# Patient Record
Sex: MALE | Race: OTHER | NOT HISPANIC OR LATINO | Employment: OTHER | URBAN - METROPOLITAN AREA
[De-identification: names, ages, dates, MRNs, and addresses within clinical notes are randomized per-mention and may not be internally consistent; named-entity substitution may affect disease eponyms.]

---

## 2018-06-19 ENCOUNTER — EVALUATION (OUTPATIENT)
Dept: PHYSICAL THERAPY | Facility: CLINIC | Age: 78
End: 2018-06-19
Payer: MEDICARE

## 2018-06-19 DIAGNOSIS — G20 PARKINSON'S DISEASE (HCC): Primary | ICD-10-CM

## 2018-06-19 PROCEDURE — G8978 MOBILITY CURRENT STATUS: HCPCS | Performed by: PHYSICAL THERAPIST

## 2018-06-19 PROCEDURE — G8979 MOBILITY GOAL STATUS: HCPCS | Performed by: PHYSICAL THERAPIST

## 2018-06-19 PROCEDURE — 97163 PT EVAL HIGH COMPLEX 45 MIN: CPT | Performed by: PHYSICAL THERAPIST

## 2018-06-19 RX ORDER — ACETAMINOPHEN 500 MG
500 TABLET ORAL EVERY 6 HOURS PRN
COMMUNITY

## 2018-06-19 RX ORDER — CARBIDOPA/LEVODOPA 25MG-250MG
1 TABLET ORAL 3 TIMES DAILY
COMMUNITY
End: 2022-02-07 | Stop reason: SDUPTHER

## 2018-06-19 NOTE — LETTER
2018    Radha Blackburn MD  [address]  [address] [facility]  [address]  [id number]    Patient: Kenton Duffy   YOB: 1940   Date of Visit: 2018     Encounter Diagnosis     ICD-10-CM    1  Parkinson's disease [facility]) [place]        Dear Dr Terri De Paz:    Please review the attached Plan of Care from Adalmontrell Alexander's recent visit  Please verify that you agree therapy should continue by signing the attached document and sending it back to our office  If you have any questions or concerns, please don't hesitate to call  Sincerely,    Eddi Freedman, PT      Referring Provider:      I certify that I have read the below Plan of Care and certify the need for these services furnished under this plan of treatment while under my care  Radha Blackburn MD  [address]  [address] [facility] 109: [phone]          PT Evaluation     Today's date: 2018  Patient name: Kenton Duffy  : 1940  MRN: [id number]  Referring provider: Rayfield Curling, MD  Dx: No diagnosis found  Assessment    Assessment details: Pt presents to physical therapy with a diagnosis of Parkinson's Disease  At this time patient has decreased LE strength, decreased neuromuscular coordination, pain in LE upon walking, dependent with transfers, unable to walk, dependent with ADLs and IADLs, and unable to stand without UE assist for more than 2 minutes  Goals for therapy are to improve ability to stand and to stand for transfers instead of using raza lift   Longer term goal to walk through home using RW  Understanding of Dx/Px/POC: good   Prognosis: fair    Goals  STG:  Pt will compelte HEP with daughter daily within 4 weeks  Pt will be able to complete sit to stand transfer with min assist within 4 weeks  Pt will be able to stay seated at edge of bed for 5 minutes within 4 weeks  Pt will be completing stand pivot transfer with mod assist within 4 weeks    LTG:  Pt will be able to walk short distance with assist through home  Pt will be able to complete all transfers without raza lift    Plan  Planned therapy interventions: strengthening, stretching, therapeutic exercise, gait training and home exercise program  Frequency: 2x week  Duration in weeks: 12  Treatment plan discussed with: patient        Subjective Evaluation    History of Present Illness  Mechanism of injury: Beginning in October pt got really sick and he was unable to walk  He was diagnosed with Parkinson's Diseaes  Prior to that he was walking independently with RW  Since the diagnosis of PD he has been unable to get up and out of bed  He occasionally does exercse in bed  He is able to use UE to maneuver but he is unable to stand  He lives in a one story home  He is unable to get up out of chair without a raza lift  He bilaterally has hip problems and that was why he needed to use a walker previously  He also has dementia  They can get him up to walk with 2-3 people helping for a few steps  He had speech therapy at home and is now on all pureed foods  He has not had physical therapy  He was not expected to live past a month upon his hospital admission in December in which he was in the hospital for a couple months  He has 24 hr care from family at home  He has not seen a neurologist since being the hospital in October  Daughter has changed dosage of sinemet due to appetite decrease when taking it  Pain  Location: lower extremities    Social Support  Lives in: [facility]  Lives with: adult children    Exercise history: some bed exercises          Objective              Sensation Left Right   Unable to assess due to decreased cognition                        Muscle Tone Left Right   Modified Anitha Scale UE WNL UE WNL   Hamstring 2+ 2+   Gastroc 3 3   Quad 2+ 2+     Strength: Lower extremity grossly 3/5 within ROM as pt can stand and bear weight through LE   Unable to test due to inability to follow multistep direction  ROM: Plan to measure LE at next visit while patient is on mat table    Transfers    Sit To Stand Mod Assist   W/C To Bed Dependent   Sit To Supine Max Assist   Roll Max Assist         Gait Assessment:Pt was able to take 4 steps forward with assist for right LE advancement and mod assist for balance in //bars with sliding feet forward         Precautions: aspiration precautions    Daily Treatment Diary     Manual                                                                                   Exercise Diary                                                                                                                                                                                                                                                                                      Modalities

## 2018-06-19 NOTE — PROGRESS NOTES
PT Evaluation     Today's date: 2018  Patient name: Haris Bonilla  : 1940  MRN: [id number]  Referring provider: Niki Figuerao MD  Dx: No diagnosis found  Assessment    Assessment details: Pt presents to physical therapy with a diagnosis of Parkinson's Disease  At this time patient has decreased LE strength, decreased neuromuscular coordination, pain in LE upon walking, dependent with transfers, unable to walk, dependent with ADLs and IADLs, and unable to stand without UE assist for more than 2 minutes  Goals for therapy are to improve ability to stand and to stand for transfers instead of using raza lift  Longer term goal to walk through home using RW  Understanding of Dx/Px/POC: good   Prognosis: fair    Goals  STG:  Pt will compelte HEP with daughter daily within 4 weeks  Pt will be able to complete sit to stand transfer with min assist within 4 weeks  Pt will be able to stay seated at edge of bed for 5 minutes within 4 weeks  Pt will be completing stand pivot transfer with mod assist within 4 weeks    LTG:  Pt will be able to walk short distance with assist through home  Pt will be able to complete all transfers without raza lift    Plan  Planned therapy interventions: strengthening, stretching, therapeutic exercise, gait training and home exercise program  Frequency: 2x week  Duration in weeks: 12  Treatment plan discussed with: patient        Subjective Evaluation    History of Present Illness  Mechanism of injury: Beginning in October pt got really sick and he was unable to walk  He was diagnosed with Parkinson's Diseaes  Prior to that he was walking independently with RW  Since the diagnosis of PD he has been unable to get up and out of bed  He occasionally does exercse in bed  He is able to use UE to maneuver but he is unable to stand  He lives in a one story home  He is unable to get up out of chair without a raza lift   He bilaterally has hip problems and that was why he needed to use a walker previously  He also has dementia  They can get him up to walk with 2-3 people helping for a few steps  He had speech therapy at home and is now on all pureed foods  He has not had physical therapy  He was not expected to live past a month upon his hospital admission in December in which he was in the hospital for a couple months  He has 24 hr care from family at home  He has not seen a neurologist since being the hospital in October  Daughter has changed dosage of sinemet due to appetite decrease when taking it  Pain  Location: lower extremities    Social Support  Lives in: [facility]  Lives with: adult children    Exercise history: some bed exercises          Objective              Sensation Left Right   Unable to assess due to decreased cognition                        Muscle Tone Left Right   Modified Anitha Scale UE WNL UE WNL   Hamstring 2+ 2+   Gastroc 3 3   Quad 2+ 2+     Strength: Lower extremity grossly 3/5 within ROM as pt can stand and bear weight through LE  Unable to test due to inability to follow multistep direction  ROM: Plan to measure LE at next visit while patient is on mat table    Transfers    Sit To Stand Mod Assist   W/C To Bed Dependent   Sit To Supine Max Assist   Roll Max Assist         Gait Assessment:Pt was able to take 4 steps forward with assist for right LE advancement and mod assist for balance in //bars with sliding feet forward         Precautions: aspiration precautions    Daily Treatment Diary     Manual                                                                                   Exercise Diary                                                                                                                                                                                                                                                                                      Modalities

## 2018-06-27 ENCOUNTER — OFFICE VISIT (OUTPATIENT)
Dept: NEUROLOGY | Facility: CLINIC | Age: 78
End: 2018-06-27
Payer: MEDICARE

## 2018-06-27 ENCOUNTER — OFFICE VISIT (OUTPATIENT)
Dept: PHYSICAL THERAPY | Facility: CLINIC | Age: 78
End: 2018-06-27
Payer: MEDICARE

## 2018-06-27 VITALS — SYSTOLIC BLOOD PRESSURE: 102 MMHG | DIASTOLIC BLOOD PRESSURE: 57 MMHG | HEART RATE: 83 BPM

## 2018-06-27 DIAGNOSIS — G20 PARKINSON'S DISEASE (HCC): Primary | ICD-10-CM

## 2018-06-27 DIAGNOSIS — R29.898 LOWER EXTREMITY WEAKNESS: ICD-10-CM

## 2018-06-27 DIAGNOSIS — F02.80 ALZHEIMER DISEASE (HCC): ICD-10-CM

## 2018-06-27 DIAGNOSIS — R13.10 DYSPHAGIA: ICD-10-CM

## 2018-06-27 DIAGNOSIS — S06.9X9A TBI (TRAUMATIC BRAIN INJURY) (HCC): ICD-10-CM

## 2018-06-27 DIAGNOSIS — G30.9 ALZHEIMER DISEASE (HCC): ICD-10-CM

## 2018-06-27 PROBLEM — S06.9XAA TBI (TRAUMATIC BRAIN INJURY): Status: ACTIVE | Noted: 2018-06-27

## 2018-06-27 PROCEDURE — 97112 NEUROMUSCULAR REEDUCATION: CPT | Performed by: PHYSICAL THERAPIST

## 2018-06-27 PROCEDURE — 99205 OFFICE O/P NEW HI 60 MIN: CPT | Performed by: PHYSICAL MEDICINE & REHABILITATION

## 2018-06-27 RX ORDER — ASPIRIN 81 MG/1
81 TABLET ORAL DAILY
COMMUNITY

## 2018-06-27 RX ORDER — LEVOTHYROXINE SODIUM 0.05 MG/1
TABLET ORAL
COMMUNITY
Start: 2018-06-23 | End: 2019-09-04 | Stop reason: SDUPTHER

## 2018-06-27 RX ORDER — TAMSULOSIN HYDROCHLORIDE 0.4 MG/1
CAPSULE ORAL
COMMUNITY
Start: 2018-06-23 | End: 2020-12-07 | Stop reason: SDUPTHER

## 2018-06-27 NOTE — PROGRESS NOTES
Daily Note     Today's date: 2018  Patient name: Yesi Carson  : 1940  MRN: [id number]  Referring provider: Lucas Lacy MD  Dx:   Encounter Diagnosis     ICD-10-CM    1  Parkinson's disease ([facility] [address] ) G20                   Subjective: Pt reports no new changes since last session      Objective: See treatment diary below      Assessment: Pt had significant lower extremity pain during any movement of LE  Attemted to stretch and teach PROm to daughter this sesison but patient had significan tpain  He was able to hold balance seated on EOM  Therapist encouraged to complete at home  Transfer sit to stand and stand pivot with min to mod assist  Completed standing this session which pt was able to hold but no steps were taken this session  Plan: Continue per plan of care        Precautions: PD, fall risk    Specialty Daily Treatment Diary     Manual         PROM LE 10 min                                           Exercise Diary         Seated EOM 2 min x 2       bridges 2x10       standing 2 min x 3       Sit to stand 5x                                                                                                                                           Modalities
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
## 2018-07-02 ENCOUNTER — TELEPHONE (OUTPATIENT)
Dept: NEUROLOGY | Facility: CLINIC | Age: 78
End: 2018-07-02

## 2018-07-10 ENCOUNTER — APPOINTMENT (OUTPATIENT)
Dept: PHYSICAL THERAPY | Facility: CLINIC | Age: 78
End: 2018-07-10
Payer: MEDICARE

## 2018-07-10 ENCOUNTER — APPOINTMENT (OUTPATIENT)
Dept: SPEECH THERAPY | Facility: CLINIC | Age: 78
End: 2018-07-10
Payer: MEDICARE

## 2018-07-12 ENCOUNTER — OFFICE VISIT (OUTPATIENT)
Dept: PHYSICAL THERAPY | Facility: CLINIC | Age: 78
End: 2018-07-12
Payer: MEDICARE

## 2018-07-12 ENCOUNTER — EVALUATION (OUTPATIENT)
Dept: SPEECH THERAPY | Facility: CLINIC | Age: 78
End: 2018-07-12
Payer: MEDICARE

## 2018-07-12 DIAGNOSIS — G20 PARKINSON'S DISEASE (HCC): Primary | ICD-10-CM

## 2018-07-12 DIAGNOSIS — R13.12 OROPHARYNGEAL DYSPHAGIA: Primary | ICD-10-CM

## 2018-07-12 DIAGNOSIS — G20 PARKINSON'S DISEASE (HCC): ICD-10-CM

## 2018-07-12 PROCEDURE — G8996 SWALLOW CURRENT STATUS: HCPCS

## 2018-07-12 PROCEDURE — 92610 EVALUATE SWALLOWING FUNCTION: CPT

## 2018-07-12 PROCEDURE — G8997 SWALLOW GOAL STATUS: HCPCS

## 2018-07-12 PROCEDURE — 97140 MANUAL THERAPY 1/> REGIONS: CPT | Performed by: PHYSICAL THERAPIST

## 2018-07-12 PROCEDURE — 97530 THERAPEUTIC ACTIVITIES: CPT | Performed by: PHYSICAL THERAPIST

## 2018-07-12 NOTE — PROGRESS NOTES
Daily Note     Today's date: 2018  Patient name: Yareli Hills  : 1940  MRN: [id number]  Referring provider: Justus Díaz MD  Dx:   Encounter Diagnosis     ICD-10-CM    1  Parkinson's disease ([facility] [address] ) Jordin Netholly        Start Time: 1500  Stop Time: 4660  Total time in clinic (min): 55 minutes    Subjective: Pt's daughter stated pt was with more soreness after last session in his knees  Did not attempt anything with him at home, did not feel comfortable  Objective: See treatment diary below      Assessment: Pt continues with significant BLE tone and increased pain in B knees specifically with WB'ing  However as session progressed and more WB'ing continued BLE, especially the L, extension improved  Pt required max assist x1 for all transfers this session  Daughter present and educated on transfers and getting pt EOB for dynamic sitting activities  Plan: Continue with [address] activities in sitting and standing      Precautions: PD, fall risk    Specialty Daily Treatment Diary     Manual         PROM LE 10 min x15 min                                          Exercise Diary        Seated EOM 2 min x 2 With cones reaching x4 cones R/L ea      bridges 2x10       standing 2 min x 3 x3 in // bars  x5 EOM      Sit to stand 5x                                                                                                                                           Modalities
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
## 2018-07-13 ENCOUNTER — TELEPHONE (OUTPATIENT)
Dept: NEUROLOGY | Facility: CLINIC | Age: 78
End: 2018-07-13

## 2018-07-13 ENCOUNTER — TELEPHONE (OUTPATIENT)
Dept: SPEECH THERAPY | Facility: CLINIC | Age: 78
End: 2018-07-13

## 2018-07-13 NOTE — TELEPHONE ENCOUNTER
Dr Chrystal Dakin, I emailed over the form you requested  Please let me know if you'd like to use that for the script for needed item, or just place a regular script  I can than fax it over to [place]  Thank you!

## 2018-07-13 NOTE — TELEPHONE ENCOUNTER
Meryle Leigh, MD Stephanie Jewelene Holiday can you help with this - see below        Thanks - Dr Consuelo Nelson      ----- Message -----   From: Slick Lema CCC-SLP   Sent: 7/13/2018  12:31 PM   To: Meryle Leigh, MD     Hi, Dr Peggy Earl:     I completed a clinical dysphagia evaluation with Mr Rhina Guadarrama yesterday  Both of his daughters were present during the evaluation  I provided them with extensive education throughout the session  I stressed the importance that he received a VFSS and provided written instructions on how to schedule the appointment  You provided me with the Rx--thank you  I followed-up with family again today to educate them again on the importance that they make the appointment as soon as possible  Within the evaluation write-up, I noted that I observed a white coating on his lingual surface (?thrush)  Family reported that they are not completing oral care  I provided them with education on aspiration precautions and the importance of proper oral care  I'm recommending at this time that they complete oral care with a powered suction system as Mr Rhina Guadarrama is with reduced cognitive ability to expectorate  [facility] provided me with information that most insurances cover this type of equipment  Can you please provide an appropriate referral to get the process of ordering the equipment initiated? Thank you so much,   JEFFREY Andrade S , 64 [facility] [place] Clinician  [employer]italStim® PLUS Certified Clinician   [address] [address], [facility] [place], [address]   Telephone: [phone]  Fax: [phone]  Email: [email or web address]

## 2018-07-13 NOTE — TELEPHONE ENCOUNTER
Spoke with patient's daughter re: the importance that family schedule VFSS as soon as possible  Reciprocal comprehension verbally expressed  Patient's daughter stated that her sister Priscila Bill: other emergency contact number) takes care of appointment scheduling--attempted to contact Leno Haroing at listed telephone number; however, unable to l/m due to mailbox being full

## 2018-07-17 ENCOUNTER — APPOINTMENT (OUTPATIENT)
Dept: PHYSICAL THERAPY | Facility: CLINIC | Age: 78
End: 2018-07-17
Payer: MEDICARE

## 2018-07-17 ENCOUNTER — APPOINTMENT (OUTPATIENT)
Dept: SPEECH THERAPY | Facility: CLINIC | Age: 78
End: 2018-07-17
Payer: MEDICARE

## 2018-07-17 DIAGNOSIS — S06.9X9S TRAUMATIC BRAIN INJURY WITH LOSS OF CONSCIOUSNESS, SEQUELA (HCC): ICD-10-CM

## 2018-07-17 DIAGNOSIS — G20 PARKINSON DISEASE (HCC): ICD-10-CM

## 2018-07-17 DIAGNOSIS — R13.12 OROPHARYNGEAL DYSPHAGIA: Primary | ICD-10-CM

## 2018-07-20 ENCOUNTER — APPOINTMENT (OUTPATIENT)
Dept: PHYSICAL THERAPY | Facility: CLINIC | Age: 78
End: 2018-07-20
Payer: MEDICARE

## 2018-07-20 ENCOUNTER — APPOINTMENT (OUTPATIENT)
Dept: SPEECH THERAPY | Facility: CLINIC | Age: 78
End: 2018-07-20
Payer: MEDICARE

## 2018-07-23 ENCOUNTER — APPOINTMENT (OUTPATIENT)
Dept: SPEECH THERAPY | Facility: CLINIC | Age: 78
End: 2018-07-23
Payer: MEDICARE

## 2018-07-23 ENCOUNTER — APPOINTMENT (OUTPATIENT)
Dept: PHYSICAL THERAPY | Facility: CLINIC | Age: 78
End: 2018-07-23
Payer: MEDICARE

## 2018-07-24 ENCOUNTER — TELEPHONE (OUTPATIENT)
Dept: SPEECH THERAPY | Facility: CLINIC | Age: 78
End: 2018-07-24

## 2018-07-24 NOTE — TELEPHONE ENCOUNTER
Attempted to contact patient's daughter via telephone again today  Patient has not yet been scheduled for a videofluoroscopic swallow study (VFSS) as recommended by this evaluating therapist  Patient's family was presented with the Rx from the referring provider, as well as step-by-step instructions on how to scheduled VFSS  A message was left on patient's daughter's voicemail to return call to this clinic

## 2018-07-26 ENCOUNTER — OFFICE VISIT (OUTPATIENT)
Dept: PHYSICAL THERAPY | Facility: CLINIC | Age: 78
End: 2018-07-26
Payer: MEDICARE

## 2018-07-26 ENCOUNTER — OFFICE VISIT (OUTPATIENT)
Dept: SPEECH THERAPY | Facility: CLINIC | Age: 78
End: 2018-07-26
Payer: MEDICARE

## 2018-07-26 DIAGNOSIS — R13.12 OROPHARYNGEAL DYSPHAGIA: Primary | ICD-10-CM

## 2018-07-26 DIAGNOSIS — G20 PARKINSON'S DISEASE (HCC): Primary | ICD-10-CM

## 2018-07-26 DIAGNOSIS — G20 PARKINSON'S DISEASE (HCC): ICD-10-CM

## 2018-07-26 PROCEDURE — 92526 ORAL FUNCTION THERAPY: CPT | Performed by: SPEECH-LANGUAGE PATHOLOGIST

## 2018-07-26 PROCEDURE — G8997 SWALLOW GOAL STATUS: HCPCS | Performed by: SPEECH-LANGUAGE PATHOLOGIST

## 2018-07-26 PROCEDURE — 97530 THERAPEUTIC ACTIVITIES: CPT | Performed by: PHYSICAL THERAPIST

## 2018-07-26 PROCEDURE — 97110 THERAPEUTIC EXERCISES: CPT | Performed by: PHYSICAL THERAPIST

## 2018-07-26 PROCEDURE — 97140 MANUAL THERAPY 1/> REGIONS: CPT | Performed by: PHYSICAL THERAPIST

## 2018-07-26 PROCEDURE — G8996 SWALLOW CURRENT STATUS: HCPCS | Performed by: SPEECH-LANGUAGE PATHOLOGIST

## 2018-07-26 NOTE — PROGRESS NOTES
Speech-Language Pathology Re-Evaluation    Today's date: 2018  Patients name: Sidney Monahan  : 1940  MRN: [id number]  Safety measures: PD, dementia, TBI, aspiration precautions (puree with NTL)  Referring provider: Bella Alexander MD    Subjective comments: Patient was accompanied to therapy today by his two daughters  Patient/family's goal(s): "To swallow safely and not to end up with aspiration pneumonia again "    Assessments    Patient had a videofluoroscopic swallow study (VFSS) completed on 2018 as recommended by this clinician  The following results were gathered from that assessment:    Moderate oropharyngeal dysphagia c/b premature spillage of nectar thick and honey thick liquids to valleculae, premature spillage to valeculae and pyriforms with deep penetration prior to swallow [with thin liquids]  No spontaneous cough with deep penetration [on thin liquids], weak unproductive cued cough       The SLP, who completed VFSS, recommended puree with NTL as the safest, least restrictive diet/liquid consistency for patient based upon findings  Whole meds in puree, upright posture for meals/meds, no thin water, no ice chips, supervision with PO intake, assistance with PO intake, one bite/sip at a time, and small bites/sips  Trial of NMES with patient: Patient consumed chocolate pudding with NTL via teaspoon  VitalStim (NMES)    Channel(s) used: 1, 2   Placement: 3b   Frequency: 50 pulses per second   Phase Duration: 200 microseconds   Treatment Duration: 33 minutes   Min mA level: 5 5 mA   Max mA level: 8 5 mA     Goals    Short-term goals:  1  (NEW GOAL) Patient will tolerate NMES (i e , VitalStim) in conjunction with HLE exercises without overt s/sx of penetration or aspiration (to be achieved in 8-12 weeks)      2  (NEW GOAL) Patient's family will implement compensatory strategies (e g , upright positioning, small bites/sips, slow rate, alternation of consistencies, etc ) when feeding patient with 100% accuracy to increase patient's swallowing safety (to be achieved in 1-2 weeks)  3  (NEW GOAL) Patient's family will demonstrate comprehension of the importance of proper oral care to implement with patient with 100% acc to reduce the risk of aspiration pneumonia (to be achieved in 1-2 weeks)  Long-term goals:  1  Patient will receive a videofluoroscopic swallow study (VFSS) to fully assess physiology and anatomy of the swallow and to determine the appropriate diet and/or rehabilitation exercises by discharge  -- MET    2  Patient will maintain adequate hydration and nutrition with optimum safety and efficacy of swallowing function on P O  intake without overt s/sx of penetration or aspiration by discharge  -- PARTIALLY MET    Functional Limitations Reporting (G-codes):   Flowsheet Rows      Most Recent Value   SLP G-Codes   FOTO information reviewed  N/A   Assessment Type  Re-evaluation   Functional Limitations  Swallowing   Swallow Current Status ()  CL   Swallow Goal Status ()  CK          Impressions/Recommendations    Impressions: Patient presents with moderate oropharyngeal dysphagia characterized by reduced cohesive bolus formation and control, a-p transfer, piecemeal deglutition, lingual pumping, reduced swallow initiation, reduced HLE upon palpation  Per VFSS, premature spillage to the valleculae and pyriform sinuses on HTL and NTL--deep penetration prior to swallow with no spontaneous cough on TL (weak unproductive cued cough)  No overt s/sx of penetration and/or aspiration were noted on this date of service; however, this does not r/o silent aspiration  Patient's daughters reported that they are completing increased oral care at home; however, lingual sores appeared from cleaning  MD reportedly prescribed mouth rinse, which they apply to patient's tongue to help with sores   Daughters reported that powered suction system was ordered and they have in their possession; however, toothbrushes with oral rinse which connect to suction device have not yet been purchased  Clinician continued to stress the importance that they initiate oral care program ASAP  Patient continues to have white coating on tongue, which is suspected to be thrush  Recommend that patient f/u with physician for proper dx and treatment  Patient should also consult with a nutritionist, as daughters reported he has lost weight throughout the year  Recommendations:  -Patient would benefit from outpatient skilled Speech Therapy services : Dysphagia therapy    -Frequency: 2x weekly  -Duration: 4-6 weeks    -Intervention certification from: 3/76/3912  -Intervention certification to: 89/14/9096    Visit Tracking:   -Referring provider: Epic  -Billing guidelines: CMS  -Visit #1/10  (total visits: 2)  -Medicare  [place]  -RE due 08/26/2018
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
## 2018-07-31 ENCOUNTER — OFFICE VISIT (OUTPATIENT)
Dept: SPEECH THERAPY | Facility: CLINIC | Age: 78
End: 2018-07-31
Payer: MEDICARE

## 2018-07-31 ENCOUNTER — OFFICE VISIT (OUTPATIENT)
Dept: PHYSICAL THERAPY | Facility: CLINIC | Age: 78
End: 2018-07-31
Payer: MEDICARE

## 2018-07-31 DIAGNOSIS — G20 PARKINSON'S DISEASE (HCC): Primary | ICD-10-CM

## 2018-07-31 DIAGNOSIS — G20 PARKINSON'S DISEASE (HCC): ICD-10-CM

## 2018-07-31 DIAGNOSIS — R13.12 OROPHARYNGEAL DYSPHAGIA: Primary | ICD-10-CM

## 2018-07-31 PROCEDURE — G8978 MOBILITY CURRENT STATUS: HCPCS | Performed by: PHYSICAL THERAPIST

## 2018-07-31 PROCEDURE — 92526 ORAL FUNCTION THERAPY: CPT | Performed by: SPEECH-LANGUAGE PATHOLOGIST

## 2018-07-31 PROCEDURE — 97112 NEUROMUSCULAR REEDUCATION: CPT | Performed by: PHYSICAL THERAPIST

## 2018-07-31 PROCEDURE — 97530 THERAPEUTIC ACTIVITIES: CPT | Performed by: PHYSICAL THERAPIST

## 2018-07-31 PROCEDURE — G8979 MOBILITY GOAL STATUS: HCPCS | Performed by: PHYSICAL THERAPIST

## 2018-07-31 NOTE — PROGRESS NOTES
Daily Speech Treatment Note    Today's date: 2018  Patients name: Sonia Chavez  : 1940  MRN: [id number]  Safety measures: PD, dementia, TBI, aspiration precautions (puree with NTL)  Referring provider: Bryant Felder MD    Primary Diagnosis/Billing code: U45 74  Secondary Diagnosis/ Billing code: Marilia Qureshi    Visit Tracking:  -Referring provider: Epic  -Billing guidelines: CMS  -Visit #2/10  (total visits: 3)  -[place]icare  [place]  -RE due 2018  Subjective/Behavioral:  -Patient remained alert during today's session  Objective/Assessment:  -Patient's family member/caregiver was present during today's session  Short-term goals:  1  (NEW GOAL) Patient will tolerate NMES (i e , VitalStim) in conjunction with HLE exercises without overt s/sx of penetration or aspiration (to be achieved in 8-12 weeks)  VitalStim (NMES)    Channel(s) used: 1, 2   Placement: 3b   Frequency: 50 pulses per second   Phase Duration: 200 microseconds   Treatment Duration: 47 minutes   Min mA level: 5 0 mA   Max mA level: 9 5 mA     Patient consumed puree banana orange medley and NTL (slightly thicker, not HTL though) via teaspoon  Patient's daughter requested that liquids be thickened "slightly more than nectar"  Clinician continued to provide daughter with education on VFSS recommendations  Patient presented with reduced cohesive bolus formation and control and AP transfer during today's session  Patient benefited from small bolus size and alternation of consistencies to increase swallowing safety  Lingual pumping and decreased swallow initiation  Reduced HLT  Patient was in the upright position (90 degree angle) during the entire session  Cough x 1 noted at end of puree trials  No other overt s/sx of penetration and/or aspiration were noted on this date of service; however, this does not r/o silent aspiration       2  (NEW GOAL) Patient's family will implement compensatory strategies (e g , upright positioning, small bites/sips, slow rate, alternation of consistencies, etc ) when feeding patient with 100% accuracy to increase patient's swallowing safety (to be achieved in 1-2 weeks)  -Reviewed safe swallowing strategies with caregiver again today  Patient's daughter was educated to observe clinician's rate of feeing, bolus size, and alternation of consistencies  Clinician will assess patient's daughter implementation of compensatory strategies with patient during next session  3  (NEW GOAL) Patient's family will demonstrate comprehension of the importance of proper oral care to implement with patient with 100% acc to reduce the risk of aspiration pneumonia (to be achieved in 1-2 weeks)  -Patient's daughter reported that family has not yet purchased suction toothbrushes to complete oral care  Lingual surface observed to have reduced white coating today  Patient's family has been applying medication to lingual surface provided about the importance of oral care with suction toothbrush system  Plan:  -Continue with current plan of care

## 2018-08-01 ENCOUNTER — OFFICE VISIT (OUTPATIENT)
Dept: SPEECH THERAPY | Facility: CLINIC | Age: 78
End: 2018-08-01
Payer: MEDICARE

## 2018-08-01 ENCOUNTER — OFFICE VISIT (OUTPATIENT)
Dept: NEUROLOGY | Facility: CLINIC | Age: 78
End: 2018-08-01
Payer: MEDICARE

## 2018-08-01 ENCOUNTER — TELEPHONE (OUTPATIENT)
Dept: NEUROLOGY | Facility: CLINIC | Age: 78
End: 2018-08-01

## 2018-08-01 ENCOUNTER — OFFICE VISIT (OUTPATIENT)
Dept: PHYSICAL THERAPY | Facility: CLINIC | Age: 78
End: 2018-08-01
Payer: MEDICARE

## 2018-08-01 VITALS — DIASTOLIC BLOOD PRESSURE: 53 MMHG | SYSTOLIC BLOOD PRESSURE: 104 MMHG | HEART RATE: 94 BPM | RESPIRATION RATE: 12 BRPM
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
## 2018-08-01 DIAGNOSIS — G82.20 SPASTIC DIPLEGIA, ACQUIRED, LOWER EXTREMITY (HCC): ICD-10-CM

## 2018-08-01 DIAGNOSIS — S06.9X9S TRAUMATIC BRAIN INJURY WITH LOSS OF CONSCIOUSNESS, SEQUELA (HCC): ICD-10-CM

## 2018-08-01 DIAGNOSIS — G20 PARKINSON'S DISEASE (HCC): Primary | ICD-10-CM

## 2018-08-01 DIAGNOSIS — R13.12 OROPHARYNGEAL DYSPHAGIA: ICD-10-CM

## 2018-08-01 DIAGNOSIS — G20 PARKINSON'S DISEASE (HCC): ICD-10-CM

## 2018-08-01 DIAGNOSIS — R13.12 OROPHARYNGEAL DYSPHAGIA: Primary | ICD-10-CM
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
## 2018-08-01 PROCEDURE — 99215 OFFICE O/P EST HI 40 MIN: CPT | Performed by: PHYSICAL MEDICINE & REHABILITATION

## 2018-08-01 PROCEDURE — 97112 NEUROMUSCULAR REEDUCATION: CPT | Performed by: PHYSICAL THERAPIST

## 2018-08-01 PROCEDURE — 92526 ORAL FUNCTION THERAPY: CPT | Performed by: SPEECH-LANGUAGE PATHOLOGIST

## 2018-08-01 RX ORDER — BACLOFEN 5 MG/1
TABLET ORAL
Qty: 90 TABLET | Refills: 3 | Status: SHIPPED | OUTPATIENT
Start: 2018-08-01 | End: 2018-11-14 | Stop reason: SDUPTHER

## 2018-08-01 NOTE — TELEPHONE ENCOUNTER
Pharm called and states that insurance will not cover 5mg tabs but will cover 10mg  He is requesting to change to 10mg and change qty and directions  Verbal ok given to change

## 2018-08-01 NOTE — PROGRESS NOTES
Daily Speech Treatment Note    Today's date: 2018  Patients name: Vanessa Pepper  : 1940  MRN: [id number]  Safety measures: PD, dementia, TBI, aspiration precautions (puree with NTL)  Referring provider: Rhianna Simons MD    Primary Diagnosis/Billing code: R13 12  Secondary Diagnosis/ Billing code: [address]    Visit Tracking:  -Referring provider: Epic  -Billing guidelines: CMS  -Visit #3/10  (total visits: 4)  -[place]icare  [place]  -RE due 2018  Subjective/Behavioral:  -Patient with reduced attention at start of session; however, once he was placed in upright position (90 degree angle) and clinician & daughter interacted with patient, his level of arousal was appropriate for PO trials  Objective/Assessment:  -Patient's family member/caregiver was present during today's session  Short-term goals:  1  (NEW GOAL) Patient will tolerate NMES (i e , VitalStim) in conjunction with HLE exercises without overt s/sx of penetration or aspiration (to be achieved in 8-12 weeks)  VitalStim (NMES)    Channel(s) used: 1, 2   Placement: 3b   Frequency: 50 pulses per second   Phase Duration: 200 microseconds   Treatment Duration: 35 minutes   Min mA level: 5 0 mA   Max mA level: 9 5 mA     With less than 10 minutes of treatment, patient became increasingly agitated and began to pull at NMES electrodes on neck  Clinician re-adjusted and lowered mA; however, about 5 minutes later, patient began to pull again  NMES was stopped at that time  Patient consumed puree banana orange medley and NTL (slightly thicker, not HTL though) via teaspoon  Patient's daughter requested that liquids be thickened "slightly more than nectar" again today  Clinician assessed patient's daughter implementation of safe swallowing strategies as she fed patient during today's session  Max verbal cues required to place patient in an upright position (90 degree angle) prior to PO intake   Patient's daughter successfully implemented small bites/sips and slow rate  Patient's daughter benefited from mod-max verbal cues from clinician to alternate consistencies during PO trials with patient  Patient presented with reduced cohesive bolus formation and control and AP transfer during today's session  Lingual pumping and decreased swallow initiation  Reduced HLE noted  Cough x 3 noted (one prior to PO intake, one during PO intake, and one at end of session)  No other overt s/sx of penetration and/or aspiration were noted on this date of service; however, this does not r/o silent aspiration  2  (NEW GOAL) Patient's family will implement compensatory strategies (e g , upright positioning, small bites/sips, slow rate, alternation of consistencies, etc ) when feeding patient with 100% accuracy to increase patient's swallowing safety (to be achieved in 1-2 weeks)  -Patient's daughter was able to recall the following safe swallow strategies at the start of today's session: upright position, small bites/sips, slow rate of feeding  Patient's daughter benefited from mod-max verbal cues from clinician to alternate consistencies during PO trials with patient  3  (NEW GOAL) Patient's family will demonstrate comprehension of the importance of proper oral care to implement with patient with 100% acc to reduce the risk of aspiration pneumonia (to be achieved in 1-2 weeks)  -Family has still not purchased suction toothbrushes to provide patient with adequate oral care despite maximum education by this clinician  Clinician continued to stress the importance that they look into purchasing--reciprocal comprehension verbally expressed  Patient's lingual surface appeared to have less white coating (?thrush)--family has been applying a liquid medication prescribed by doctor to lingual surface  Plan:  -Continue with current plan of care
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
## 2018-08-01 NOTE — PROGRESS NOTES
Daily Note     Today's date: 2018  Patient name: Benoit Almaraz  : 1940  MRN: [id number]  Referring provider: Lavern Johnson MD  Dx:   Encounter Diagnosis     ICD-10-CM    1  Parkinson's disease ([facility] ) Kalli Severe new changes since last session reproted    Objective: See treatment diary below      Assessment: Pt was able to complete ambulation this session for5 feet with assist  Therapist suggested daughter attempt seated at EOB at home in order to work on trunk control  Daughter voiced understanding and demonstrated independence  Plan: Continue with [address] activities in sitting and standing  Precautions: PD, fall risk    Specialty Daily Treatment Diary     Manual        PROM LE 10 min x15 min x10 min                                         Exercise Diary       Seated EOM 2 min x 2 With cones reaching x4 cones R/L ea x10 min  See below     bridges 2x10       standing 2 min x 3 x3 in // bars  x5 EOM // bars  Multiple reps  x20 min  maxAx1     Sit to stand 5x       Cone reaching R/L   Seated EOM  With turning to reach  x15 cones ea     BLE marches, abd, LAQ, DF   Seated EOM  x15 ea with assist     Forward ball roll outs   2x15 with assist, daughter proving [address] through knees                                                                                                                 Modalities                                        Daily Note     Today's date: 2018  Patient name: Benoit Almaraz  : 1940  MRN: [id number]  Referring provider: Lavern Johnson MD  Dx:   Encounter Diagnosis     ICD-10-CM    1  Parkinson's disease ([facility] ) G20                   Subjective:     Objective: See treatment diary below      Assessment:     Plan: Continue with [address] activities in sitting and standing      Precautions: PD, fall risk    Specialty Daily Treatment Diary     Manual        PROM LE 10 min x15 min x10 min Exercise Diary  6/27 7/12 7/26 7/31    Seated EOM 2 min x 2 With cones reaching x4 cones R/L ea x10 min  See below x10 min    bridges 2x10       standing 2 min x 3 x3 in // bars  x5 EOM // bars  Multiple reps  x20 min  maxAx1 //bars mod A x1 multiple reps for 20 min    Sit to stand 5x       Cone reaching R/L   Seated EOM  With turning to reach  x15 cones ea     BLE marches, abd, LAQ, DF   Seated EOM  x15 ea with assist     Forward ball roll outs   2x15 with assist, daughter proving WB'ing through knees     Walking     Solo step: mx assist LE advancement 5 ft                                                                                                        Modalities

## 2018-08-01 NOTE — PROGRESS NOTES
Daily Note     Today's date: 2018  Patient name: Tavo Ashley  : 1940  MRN: [id number]  Referring provider: Loni Azevedo MD  Dx:   Encounter Diagnosis     ICD-10-CM    1  Parkinson's disease ([facility] [address] ) Kenyatta Hernández new changes since last session reproted    Objective: See treatment diary below      Assessment: Pt was able to complete ambulation this session for5 feet with assist  Therapist suggested daughter attempt seated at EOB at home in order to work on trunk control  Daughter voiced understanding and demonstrated independence  Plan: Continue with [address] activities in sitting and standing  Precautions: PD, fall risk    Specialty Daily Treatment Diary             Daily Note     Today's date: 2018  Patient name: Tavo Ashley  : 1940  MRN: [id number]  Referring provider: Loni Azevedo MD  Dx:   Encounter Diagnosis     ICD-10-CM    1  Park[facility]'s disease ([facility] ) G20                   Subjective: Just saw Dr Mukund Blum who has recommended knee extension bracing    Objective: See treatment diary below      Assessment: Pt was fatigued and painful this session and not willing to stay standing  Poorly tolerated range of motion  Held on seated at [facility] and ended session early as patient was not willing to participate  Plan: Continue with [address] activities in sitting and standing      Precautions: PD, fall risk    Specialty Daily Treatment Diary     Manual       PROM LE 10 min x15 min x10 min 10 min                                        Exercise Diary     Seated EOM 2 min x 2 With cones reaching x4 cones R/L ea x10 min  See below x10 min    bridges 2x10       standing 2 min x 3 x3 in // bars  x5 EOM // bars  Multiple reps  x20 min  maxAx1 //bars mod A x1 multiple reps for 20 min 10 min multiple times   Sit to stand 5x    5x   Cone reaching R/L   Seated EOM  With turning to reach  x15 cones ea     BLE marches, abd, LAQ, DF   Seated EOM  x15 ea with assist     Forward ball roll outs   2x15 with assist, daughter proving [address] through knees     Walking     Solo step: mx assist LE advancement 5 ft                                                                                                        Modalities

## 2018-08-03 ENCOUNTER — APPOINTMENT (OUTPATIENT)
Dept: PHYSICAL THERAPY | Facility: CLINIC | Age: 78
End: 2018-08-03
Payer: MEDICARE

## 2018-08-03 ENCOUNTER — APPOINTMENT (OUTPATIENT)
Dept: SPEECH THERAPY | Facility: CLINIC | Age: 78
End: 2018-08-03
Payer: MEDICARE

## 2018-08-07 ENCOUNTER — OFFICE VISIT (OUTPATIENT)
Dept: PHYSICAL THERAPY | Facility: CLINIC | Age: 78
End: 2018-08-07
Payer: MEDICARE

## 2018-08-07 ENCOUNTER — OFFICE VISIT (OUTPATIENT)
Dept: SPEECH THERAPY | Facility: CLINIC | Age: 78
End: 2018-08-07
Payer: MEDICARE

## 2018-08-07 DIAGNOSIS — R13.12 OROPHARYNGEAL DYSPHAGIA: Primary | ICD-10-CM

## 2018-08-07 DIAGNOSIS — G20 PARKINSON'S DISEASE (HCC): ICD-10-CM

## 2018-08-07 DIAGNOSIS — G20 PARKINSON'S DISEASE (HCC): Primary | ICD-10-CM

## 2018-08-07 PROCEDURE — 97112 NEUROMUSCULAR REEDUCATION: CPT | Performed by: PHYSICAL THERAPIST

## 2018-08-07 PROCEDURE — 97530 THERAPEUTIC ACTIVITIES: CPT | Performed by: PHYSICAL THERAPIST

## 2018-08-07 PROCEDURE — 92526 ORAL FUNCTION THERAPY: CPT

## 2018-08-07 NOTE — PROGRESS NOTES
Daily Speech Treatment Note    Today's date: 2018  Patients name: Faby Munguia  : 1940  MRN: [id number]  Safety measures: PD, dementia, TBI, aspiration precautions (puree with NTL)  Referring provider: Jd Rios MD    Primary Diagnosis/Billing code: T94 [id number]  Secondary Diagnosis/ Billing code: [address]    Visit Tracking:  -Referring provider: Epic  -Billing guidelines: CMS  -Visit #4/10  (total visits: 5)  -[place]  [place]  -RE due 2018  Subjective/Behavioral:  -Patient continues with reduced attention at start of sessio and was noted to be in a reclined position- cues provided for upright positioning  Attention improved with upright positing and po intake  Patients daughter reports she gives HTL at home as she is fearful of recurrent PNA  Patients daughter also reports she accidentally left cherries in front of him the other day which he ate, but spit out the pits  Patients daughter reports fear of PNA and that she thickens to HTL at home because of this  Objective/Assessment:  -Patient's family member/caregiver was present during today's session  Patients' family fed patient all trials today  Short-term goals:  1  (NEW GOAL) Patient will tolerate NMES (i e , VitalStim) in conjunction with HLE exercises without overt s/sx of penetration or aspiration (to be achieved in 8-12 weeks)  Patient tolerated NMES for 35 mins (Min threshold 7 0mA to max threshold 9 0mA) in conjunction with po intake and exercises  Patient consumed pureed banana/ apple/pear blend with NTL via teaspoon  Patient's daughter requested that liquids be thickened "slightly more than nectar" again today- discussed results of video and recommendations for trials of NTL to advance patient  Reciprocal comprehension was verbally expressed- patients daughter agreeable to NTL today  Clinician assessed patient's daughter implementation of safe swallowing strategies as she fed patient during today's session  Initial verbal cue required to place patient in an upright position (90 degree angle) prior to PO intake  Patient's daughter independently utilized small bites/sips and slow rate  She fed puree and liquids in isolation of each other and at the end stated "I forgot to alternate them" Despite this patient appeared to tolerate the textures in isolation without difficulty  In addition to the above strategies she was encouraged to engage the patient throughout intake for vocal quality checks due to limited cough/throat clear response noted on video- max cues required to carry this over today  Education provided on use of cold temp and flavoring in drinks for improved initiation time  Patient with reduced bolus formation and delayed transfer with no significant oral residue noted  No overt s/s of reduced oral control  Lingual pumping and mild-moderately delayed swallow initiation noted across all consistencies today- slightly improved with added flavor  Mildly reduced HLE noted  No coughing or throat clearing today  No change in vocal quality or respiratory status noted  No overt distress or s/sx of penetration and/or aspiration were noted on this date of service; however, this does not r/o silent aspiration  Initiated exercises for improved timing utilizing lemon glycering swabs- patients daughter was educated on use of and frequency of exercises- improved swallow initiation timing noted with use of lemon swabs today  2  (NEW GOAL) Patient's family will implement compensatory strategies (e g , upright positioning, small bites/sips, slow rate, alternation of consistencies, etc ) when feeding patient with 100% accuracy to increase patient's swallowing safety (to be achieved in 1-2 weeks)  See above    3   (NEW GOAL) Patient's family will demonstrate comprehension of the importance of proper oral care to implement with patient with 100% acc to reduce the risk of aspiration pneumonia (to be achieved in 1-2 weeks)  Patients daughter reports she has been providing oral care but that the frequency is variable but continues with Nystatin  Plan:  -Continue with current plan of care       JEFFREY Mills , [address] [place] [address] Language Pathologist   [id number]

## 2018-08-08 ENCOUNTER — HOSPITAL ENCOUNTER (OUTPATIENT)
Dept: CT IMAGING | Facility: HOSPITAL | Age: 78
Discharge: HOME | End: 2018-08-08
Payer: MEDICARE

## 2018-08-08 ENCOUNTER — TELEPHONE (OUTPATIENT)
Dept: NEUROLOGY | Facility: CLINIC | Age: 78
End: 2018-08-08
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
## 2018-08-08 PROCEDURE — 70450 CT HEAD/BRAIN W/O DYE: CPT

## 2018-08-08 NOTE — PROGRESS NOTES
Daily Note     Today's date: 2018  Patient name: Sri Cao  : 1940  MRN: [id number]  Referring provider: Carrie French MD  Dx:   Encounter Diagnosis     ICD-10-CM    1  Parkinson's disease ([facility] [address] ) Charley Abbasi new changes since last session reproted    Objective: See treatment diary below      Assessment: Pt was able to complete ambulation this session for5 feet with assist  Therapist suggested daughter attempt seated at EOB at home in order to work on trunk control  Daughter voiced understanding and demonstrated independence  Plan: Continue with [address] activities in sitting and standing  Precautions: PD, fall risk    Specialty Daily Treatment Diary             Daily Note     Today's date: 2018  Patient name: Sri Cao  : 1940  MRN: [id number]  Referring provider: Carrie French MD  Dx:   Encounter Diagnosis     ICD-10-CM    1  Parkinson's disease ([facility] [address] ) G20                   Subjective: Just saw Dr Stepan Watt who has recommended knee extension bracing    Objective: See treatment diary below      Assessment: Patient was able to complete standing this session with 2" block under right leg which was able to accept more weight  Noted improvement in PROM after weight bearing  Pt was able to stay seated at [facility] with mininaml UE use with reaching without lob  Plan to continue to encourage daughter to assist with seated EOM at home       Plan: Continue per POC    Precautions: PD, fall risk    Specialty Daily Treatment Diary     Manual       PROM LE 10 min x15 min x10 min 10 min                                        Exercise Diary         Seated EOM 15 min with reachig       bridges        standing //bars solo 6x for 3-4 min ea with block under right ft       Sit to stand 5x       [facility] reaching R/L        BLE marches, abd, LAQ, DF        Forward ball roll outs        Walking         Knee extension stretch seated 4x60" ea Modalities

## 2018-08-09 ENCOUNTER — OFFICE VISIT (OUTPATIENT)
Dept: SPEECH THERAPY | Facility: CLINIC | Age: 78
End: 2018-08-09
Payer: MEDICARE

## 2018-08-09 ENCOUNTER — OFFICE VISIT (OUTPATIENT)
Dept: PHYSICAL THERAPY | Facility: CLINIC | Age: 78
End: 2018-08-09
Payer: MEDICARE

## 2018-08-09 DIAGNOSIS — G20 PARKINSON'S DISEASE (HCC): Primary | ICD-10-CM

## 2018-08-09 DIAGNOSIS — R13.12 OROPHARYNGEAL DYSPHAGIA: Primary | ICD-10-CM

## 2018-08-09 DIAGNOSIS — G20 PARKINSON'S DISEASE (HCC): ICD-10-CM

## 2018-08-09 PROCEDURE — 92526 ORAL FUNCTION THERAPY: CPT

## 2018-08-09 PROCEDURE — 97112 NEUROMUSCULAR REEDUCATION: CPT | Performed by: PHYSICAL THERAPIST

## 2018-08-09 NOTE — PROGRESS NOTES
Daily Speech Treatment Note    Today's date: 2018  Patients name: Giancarlo Schwarz  : 1940  MRN: [id number]  Safety measures: PD, dementia, TBI, aspiration precautions (puree with NTL)  Referring provider: Alyssa Akhtar MD    Primary Diagnosis/Billing code: V87 19  Secondary Diagnosis/ Billing code: [address]    Visit Tracking:  -Referring provider: Epic  -Billing guidelines: CMS  -Visit #/10  (total visits: 6)   -[place]  -RE due 2018  Subjective/Behavioral:  "He is really tired" - patient's daughter was present during session    Objective/Assessment:  -Patient's family member/caregiver was present during today's session  Patients' family fed patient all trials today  Short-term goals:  1  (NEW GOAL) Patient will tolerate NMES (i e , VitalStim) in conjunction with HLE exercises without overt s/sx of penetration or aspiration (to be achieved in 8-12 weeks)  Initiated exercises for improved timing utilizing lemon glycering swabs- patients daughter was educated on use of and frequency of exercises- improved swallow initiation timing noted with use of lemon swabs today  Patient tolerated NMES for 30 mins (Min threshold 3 0mA to max threshold 7  0mA) in conjunction with po intake and exercises  Patient consumed pureed banana/ apple/pear blend with NTL water via teaspoon  Patient's daughter requested more education on proper consistency of NTL  Demonstration provided  Reciprocal comprehension was verbally expressed  Clinician assessed patient's daughter implementation of safe swallowing strategies as she fed patient during today's session  Initial verbal cue required to place patient in an upright position (90 degree angle) prior to PO intake  Patient's daughter independently utilized small bites/sips and slow rate  She alternated consistencies appropriately throughout feeding   She also engaged the patient throughout intake for vocal quality checks due to limited cough/throat clear response noted on video- mod cues required to carry this over today  Patient with reduced bolus formation and delayed transfer with no significant oral residue noted  No overt s/s of reduced oral control  Lingual pumping and mild-moderately delayed swallow initiation noted across all consistencies today- slightly improved with added flavor  Mildly reduced HLE noted  No coughing or throat clearing today  No change in vocal quality or respiratory status noted  No overt distress or s/sx of penetration and/or aspiration were noted on this date of service; however, this does not r/o silent aspiration  2  (NEW GOAL) Patient's family will implement compensatory strategies (e g , upright positioning, small bites/sips, slow rate, alternation of consistencies, etc ) when feeding patient with 100% accuracy to increase patient's swallowing safety (to be achieved in 1-2 weeks)  See above    3  (NEW GOAL) Patient's family will demonstrate comprehension of the importance of proper oral care to implement with patient with 100% acc to reduce the risk of aspiration pneumonia (to be achieved in 1-2 weeks)  Patients daughter reports she has been providing oral care but that the frequency is variable but continues with Nystatin  Plan:  -Continue with current plan of care

## 2018-08-10 NOTE — PROGRESS NOTES
Daily Note     Today's date: 8/10/2018  Patient name: James Stevenson  : 1940  MRN: [id number]  Referring provider: Nishi Chowdhury MD  Dx:   Encounter Diagnosis     ICD-10-CM    1  Parkinson's disease ([facility] [address] ) Rondi Show new changes since last session reproted    Objective: See treatment diary below      Assessment: Pt was able to complete ambulation this session for5 feet with assist  Therapist suggested daughter attempt seated at EOB at home in order to work on trunk control  Daughter voiced understanding and demonstrated independence  Plan: Continue with [address] activities in sitting and standing  Precautions: PD, fall risk    Specialty Daily Treatment Diary             Daily Note     Today's date: 8/10/2018  Patient name: James Stevenson  : 1940  MRN: [id number]  Referring provider: Nishi Chowdhury MD  Dx:   Encounter Diagnosis     ICD-10-CM    1  Parkinson's disease ([facility] ) G20                   Subjective: Daughter reports patient LE are very sore today    Objective: See treatment diary below    Assessment: Patient demonstrated good seated balance with ball toss but did have LOB when turning and fatigued quickly  Plan to continue at next session in order to build trunk balance  Pt is not progressing with standing tolerance or with LE ROM at this time but will continue to attempt at each session but focus will be on seated balance       Plan: Continue per POC    Precautions: PD, fall risk    Specialty Daily Treatment Diary     Manual       PROM LE 10 min x15 min x10 min 10 min                                        Exercise Diary        Seated EOM 15 min with reachig 15 min with ball toss and reaching      bridges        standing //bars solo 6x for 3-4 min ea with block under right ft 5x with block under right foot for 3-4 min ea      Sit to stand 5x 5x      [facility] reaching R/L        BLE marches, abd, LAQ, DF        Forward ball roll outs        Walking Knee extension stretch seated 4x60" ea 30"x3                                                                                                  Modalities

## 2018-08-10 NOTE — PROGRESS NOTES
Daily Speech Treatment Note    Today's date: 2018  Patients name: Sidney Monahan  : 1940  MRN: [id number]  Safety measures: PD, dementia, TBI, aspiration precautions (puree with NTL)  Referring provider: Bella Alexander MD    Primary Diagnosis/Billing code: T60 47  Secondary Diagnosis/ Billing code: [address]    Visit Tracking:  -Referring provider: Epic  -Billing guidelines: CMS  -Visit #6/10  (total visits: 7)   -[place]  -RE due 2018  Subjective/Behavioral:  -Patient with heightened level of arousal during today's session  Objective/Assessment:  -Patient's family member/caregiver was present during today's session  Short-term goals:  1  (NEW GOAL) Patient will tolerate NMES (i e , VitalStim) in conjunction with HLE exercises without overt s/sx of penetration or aspiration (to be achieved in 8-12 weeks)  Initiated exercises for improved swallow initiation utilizing lemon glycering swabs- patients daughter was educated on use of and frequency of exercises- improved swallow initiation timing noted with use of lemon swabs today  Patient tolerated NMES for 33 minutes in conjunction with PO intake and exercises  (min threshold 3 5 mA to max threshold 6 5 mA)     Patient consumed pureed peach, squash, and apple blend with NTL water via teaspoon  Clinician analyzed patient's daughter implementation of safe swallowing strategies as she fed patient during today's session  Initial verbal cue required to place patient in an upright position (90 degree angle) prior to PO intake; however, patient's daughter independently utilized small bites/sips, slow rate, and alternation of consistencies appropriately throughout feeding  She also engaged the patient throughout PO intake for vocal quality checks due to patient's limited cough/throat clear response  Patient noted to have reduced bolus formation and delayed transfer with no significant oral residue noted   No overt s/s of reduced oral control  Lingual pumping and mild-moderately delayed swallow initiation noted across all consistencies today  Mildly reduced HLE noted  No coughing or throat clearing today  No change in vocal quality or respiratory status noted  No overt distress or s/sx of penetration and/or aspiration were noted on this date of service; however, this does not r/o silent aspiration  2  (NEW GOAL) Patient's family will implement compensatory strategies (e g , upright positioning, small bites/sips, slow rate, alternation of consistencies, etc ) when feeding patient with 100% accuracy to increase patient's swallowing safety (to be achieved in 1-2 weeks)  3  (NEW GOAL) Patient's family will demonstrate comprehension of the importance of proper oral care to implement with patient with 100% acc to reduce the risk of aspiration pneumonia (to be achieved in 1-2 weeks)  Plan:  -Continue with current plan of care

## 2018-08-13 ENCOUNTER — OFFICE VISIT (OUTPATIENT)
Dept: SPEECH THERAPY | Facility: CLINIC | Age: 78
End: 2018-08-13
Payer: MEDICARE

## 2018-08-13 ENCOUNTER — OFFICE VISIT (OUTPATIENT)
Dept: PHYSICAL THERAPY | Facility: CLINIC | Age: 78
End: 2018-08-13
Payer: MEDICARE

## 2018-08-13 DIAGNOSIS — R13.12 OROPHARYNGEAL DYSPHAGIA: Primary | ICD-10-CM

## 2018-08-13 DIAGNOSIS — G20 PARKINSON'S DISEASE (HCC): ICD-10-CM

## 2018-08-13 DIAGNOSIS — G20 PARKINSON'S DISEASE (HCC): Primary | ICD-10-CM

## 2018-08-13 PROCEDURE — 97112 NEUROMUSCULAR REEDUCATION: CPT | Performed by: PHYSICAL THERAPIST

## 2018-08-13 PROCEDURE — 92526 ORAL FUNCTION THERAPY: CPT | Performed by: SPEECH-LANGUAGE PATHOLOGIST

## 2018-08-14 ENCOUNTER — OFFICE VISIT (OUTPATIENT)
Dept: SPEECH THERAPY | Facility: CLINIC | Age: 78
End: 2018-08-14
Payer: MEDICARE

## 2018-08-14 ENCOUNTER — OFFICE VISIT (OUTPATIENT)
Dept: PHYSICAL THERAPY | Facility: CLINIC | Age: 78
End: 2018-08-14
Payer: MEDICARE

## 2018-08-14 DIAGNOSIS — R13.12 OROPHARYNGEAL DYSPHAGIA: Primary | ICD-10-CM

## 2018-08-14 DIAGNOSIS — G20 PARKINSON'S DISEASE (HCC): ICD-10-CM

## 2018-08-14 DIAGNOSIS — G20 PARKINSON'S DISEASE (HCC): Primary | ICD-10-CM

## 2018-08-14 PROCEDURE — 97112 NEUROMUSCULAR REEDUCATION: CPT | Performed by: PHYSICAL THERAPIST

## 2018-08-14 PROCEDURE — 97530 THERAPEUTIC ACTIVITIES: CPT | Performed by: PHYSICAL THERAPIST

## 2018-08-14 PROCEDURE — 92526 ORAL FUNCTION THERAPY: CPT

## 2018-08-14 NOTE — PROGRESS NOTES
Daily Note     Today's date: 2018  Patient name: Layla Valero  : 1940  MRN: [id number]  Referring provider: Mylene Santiago MD  Dx:   No diagnosis found  Subjective:NO new changes since last session reproted    Objective: See treatment diary below      Assessment: Pt was able to complete ambulation this session for5 feet with assist  Therapist suggested daughter attempt seated at EOB at home in order to work on trunk control  Daughter voiced understanding and demonstrated independence  Plan: Continue with [address] activities in sitting and standing  Precautions: PD, fall risk    Specialty Daily Treatment Diary             Daily Note     Today's date: 2018  Patient name: Layla Valero  : 1940  MRN: [id number]  Referring provider: Mylene Santiago MD  Dx:   No diagnosis found  Subjective: Daughter reports no new changes    Objective: See treatment diary below    Assessment: Patient was unable to sit unassisted at [facility] this session  Trialed ball toss and reaching and just sitting but he would not stay upright  Pt refused to put weight through R LE when standing and wasn't able to remain standing for long period of time  Will attempt both at next session       Plan: Continue per POC    Precautions: PD, fall risk    Specialty Daily Treatment Diary     Manual       PROM LE 10 min x15 min x10 min 10 min                                        Exercise Diary       Seated EOM 15 min with reachig 15 min with ball toss and reaching 15 min attempted but unable     bridges        standing //bars solo 6x for 3-4 min ea with block under right ft 5x with block under right foot for 3-4 min ea 4x with block under right foot no more than 1 min at a time     Sit to stand 5x 5x 5x     [facility] reaching R/L        BLE marches, abd, LAQ, DF        Forward ball roll outs        Walking         Knee extension stretch seated 4x60" ea 30"x3 Modalities

## 2018-08-14 NOTE — PROGRESS NOTES
Daily Speech Treatment Note    Today's date: 2018  Patients name: Twyla Franco  : 1940  MRN: [id number]  Safety measures: PD, dementia, TBI, aspiration precautions (puree with NTL)  Referring provider: Valeria Stockton MD    Primary Diagnosis/Billing code: J38 98  Secondary Diagnosis/ Billing code: [address]    Visit Tracking:  -Referring provider: Epic  -Billing guidelines: CMS  -Visit #7/10  (total visits: 8)   -[place]  -RE due 2018  Subjective/Behavioral:  "I had a hard time getting him in the chair today"    Objective/Assessment:  -Patient's family member/caregiver was present during today's session  Short-term goals:  1  (NEW GOAL) Patient will tolerate NMES (i e , VitalStim) in conjunction with HLE exercises without overt s/sx of penetration or aspiration (to be achieved in 8-12 weeks)  Initiated exercises for improved swallow initiation utilizing lemon glycering swabs- patients daughter was educated on use of and frequency of exercises- improved swallow initiation timing noted with use of lemon swabs today  Patient tolerated NMES for 30 minutes in conjunction with PO intake and exercises  (min threshold 4 0 mA to max threshold 8 0 mA)  More sensitivity noted today during treatment, he kept pulling at his neck and c/o pain all over  He also slowly slumped in his chair and needed re-positioning at times  Patient consumed pureed peach, squash, and apple blend with NTL water via teaspoon  Clinician analyzed patient's daughter implementation of safe swallowing strategies as she fed patient during today's session  Initial verbal cue required to place patient in an upright position (90 degree angle) prior to PO intake; however, patient's daughter independently utilized small bites/sips, slow rate, and alternation of consistencies appropriately throughout feeding   She also engaged the patient throughout PO intake for vocal quality checks due to patient's limited cough/throat clear response  Patient noted to have reduced bolus formation and delayed transfer with no significant oral residue noted  No overt s/s of reduced oral control  Lingual pumping and mild-moderately delayed swallow initiation noted across all consistencies today  Mildly reduced HLE noted  No coughing or throat clearing today  No change in vocal quality or respiratory status noted  No overt distress or s/sx of penetration and/or aspiration were noted on this date of service; however, this does not r/o silent aspiration  2  (NEW GOAL) Patient's family will implement compensatory strategies (e g , upright positioning, small bites/sips, slow rate, alternation of consistencies, etc ) when feeding patient with 100% accuracy to increase patient's swallowing safety (to be achieved in 1-2 weeks)  3  (NEW GOAL) Patient's family will demonstrate comprehension of the importance of proper oral care to implement with patient with 100% acc to reduce the risk of aspiration pneumonia (to be achieved in 1-2 weeks)  Plan:  -Continue with current plan of care

## 2018-08-15 NOTE — PROGRESS NOTES
Daily Note     Today's date: 8/15/2018  Patient name: Mitul Agrawal  : 1940  MRN: [id number]  Referring provider: Johnathan Torres MD  Dx:   Encounter Diagnosis     ICD-10-CM    1  Parkinson's disease ([facility] [address] ) Nolia Card new changes since last session reproted    Objective: See treatment diary below      Assessment: Pt was able to complete ambulation this session for5 feet with assist  Therapist suggested daughter attempt seated at EOB at home in order to work on trunk control  Daughter voiced understanding and demonstrated independence  Plan: Continue with [address] activities in sitting and standing  Precautions: PD, fall risk    Specialty Daily Treatment Diary             Daily Note     Today's date: 8/15/2018  Patient name: Mitul Agrawal  : 1940  MRN: [id number]  Referring provider: Johnathan Torres MD  Dx:   Encounter Diagnosis     ICD-10-CM    1  Parkinson's disease ([facility] ) G20                   Subjective: Daughter reports no new changes    Objective: See treatment diary below    Assessment: Pt was able to complete standing this session with greater weight bearing through right LE compared to previous sessions with block under foot  He completed sit to stands with less assistance as well  He was unable to remain seated at [facility] for more than 30 seconds without assistance       Plan: Continue per POC    Precautions: PD, fall risk    Specialty Daily Treatment Diary     Manual      PROM LE 10 min x15 min x10 min 10 min 5 min knee extension                                       Exercise Diary      Seated EOM 15 min with reachig 15 min with ball toss and reaching 15 min attempted but unable 15 min with ball toss and reaching min to mod assist    bridges        standing //bars solo 6x for 3-4 min ea with block under right ft 5x with block under right foot for 3-4 min ea 4x with block under right foot no more than 1 min at a time 10 x 30 sec ea    Sit to stand 5x 5x 5x 10x    [facility] reaching R/L        JANICE oquendo, abd, LAQ, DF        Forward ball roll outs        Walking         Knee extension stretch seated 4x60" ea 30"x3                                                                                                  Modalities

## 2018-08-24 ENCOUNTER — OFFICE VISIT (OUTPATIENT)
Dept: NEUROLOGY | Facility: CLINIC | Age: 78
End: 2018-08-24
Payer: MEDICARE

## 2018-08-24 VITALS — SYSTOLIC BLOOD PRESSURE: 112 MMHG | DIASTOLIC BLOOD PRESSURE: 62 MMHG

## 2018-08-24 DIAGNOSIS — G82.20 ACQUIRED SPASTIC DIPLEGIA OF LOWER EXTREMITIES (HCC): Primary | ICD-10-CM

## 2018-08-24 PROCEDURE — 99214 OFFICE O/P EST MOD 30 MIN: CPT | Performed by: PHYSICAL MEDICINE & REHABILITATION

## 2018-08-24 NOTE — PATIENT INSTRUCTIONS
1  Continue regular stretching activities at home, as demonstrated during this clinic visit  2  Continue current oral Baclofen dose  3  Botox injection appointment will be scheduled when authorization is obtained
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
## 2018-08-25 NOTE — PROGRESS NOTES
Speech-Language Pathology Re-Evaluation    Today's date: 2018 (***update)  Patients name: Cuca Carmona  : 1940  MRN: [id number]  Safety measures: PD, dementia, TBI, aspiration precautions (puree with NTL)  Referring provider: Lowell Jaime MD    Subjective comments: ***    Patient/family's goal(s): "To swallow safely and not to end up with aspiration pneumonia again "    Assessments    ***    Patient had a videofluoroscopic swallow study (VFSS) completed on 2018 as recommended by this clinician  The following results were gathered from that assessment:     Moderate oropharyngeal dysphagia c/b premature spillage of nectar thick and honey thick liquids to valleculae, premature spillage to valeculae and pyriforms with deep penetration prior to swallow [with thin liquids]  No spontaneous cough with deep penetration [on thin liquids], weak unproductive cued cough       The SLP, who completed VFSS, recommended puree with NTL as the safest, least restrictive diet/liquid consistency for patient based upon findings  Whole meds in puree, upright posture for meals/meds, no thin water, no ice chips, supervision with PO intake, assistance with PO intake, one bite/sip at a time, and small bites/sips         VitalStim (NMES)     Channel(s) used: 1, 2   Placement: 3b   Frequency: 50 pulses per second   Phase Duration: 200 microseconds   Treatment Duration: 33 minutes   Min mA level: 5 5 mA   Max mA level: 8 5 mA        Goals    Short-term goals:  1  Patient will tolerate NMES (i e , VitalStim) in conjunction with HLE exercises without overt s/sx of penetration or aspiration (to be achieved in 8-12 weeks)  -- PARTIALLY MET     2  Patient's family will implement compensatory strategies (e g , upright positioning, small bites/sips, slow rate, alternation of consistencies, etc ) when feeding patient with 100% accuracy to increase patient's swallowing safety (to be achieved in 1-2 weeks)   -- MET     3   Patient's family will demonstrate comprehension of the importance of proper oral care to implement with patient with 100% acc to reduce the risk of aspiration pneumonia (to be achieved in 1-2 weeks)  -- PARTIALLY MET     Long-term goals:  1  Patient will receive a videofluoroscopic swallow study (VFSS) to fully assess physiology and anatomy of the swallow and to determine the appropriate diet and/or rehabilitation exercises by discharge  -- MET     2  Patient will maintain adequate hydration and nutrition with optimum safety and efficacy of swallowing function on P O  intake without overt s/sx of penetration or aspiration by discharge  -- PARTIALLY MET    Functional Limitations Reporting (G-codes):   ***    Impressions/Recommendations    Impressions: Patient presents with ***    OLD: Patient presents with moderate oropharyngeal dysphagia characterized by reduced cohesive bolus formation and control, a-p transfer, piecemeal deglutition, lingual pumping, reduced swallow initiation, reduced HLE upon palpation  Per VFSS, premature spillage to the valleculae and pyriform sinuses on HTL and NTL--deep penetration prior to swallow with no spontaneous cough on TL (weak unproductive cued cough)  No overt s/sx of penetration and/or aspiration were noted on this date of service; however, this does not r/o silent aspiration       Patient's daughters reported that they are completing increased oral care at home; however, lingual sores appeared from cleaning  MD reportedly prescribed mouth rinse, which they apply to patient's tongue to help with sores  Daughters reported that powered suction system was ordered and they have in their possession; however, toothbrushes with oral rinse which connect to suction device have not yet been purchased  Clinician continued to stress the importance that they initiate oral care program ASAP      Patient continues to have white coating on tongue, which is suspected to be thrush   Recommend that patient f/u with physician for proper dx and treatment  Patient should also consult with a nutritionist, as daughters reported he has lost weight throughout the year  Recommendations:  -Patient would benefit from outpatient skilled Speech Therapy services : Dysphagia therapy    -Frequency: 2x weekly  -Duration: 4-6 weeks    -Intervention certification from: 2/80/4597  -Intervention certification to:     Visit Tracking:   -Referring provider: Epic  -Billing guidelines: CMS  -Visit #7/10  (total visits: 8)   -[place]icare  [place]    -RE due ***

## 2018-08-27 ENCOUNTER — APPOINTMENT (OUTPATIENT)
Dept: SPEECH THERAPY | Facility: CLINIC | Age: 78
End: 2018-08-27
Payer: MEDICARE

## 2018-08-27 ENCOUNTER — APPOINTMENT (OUTPATIENT)
Dept: PHYSICAL THERAPY | Facility: CLINIC | Age: 78
End: 2018-08-27
Payer: MEDICARE

## 2018-08-28 NOTE — PROGRESS NOTES
Speech-Language Pathology Re-Evaluation    Today's date: 2018   Patients name: Maddi Todd  : 1940  MRN: [id number]  Safety measures: PD, dementia, TBI, aspiration precautions (puree with NTL)  Referring provider: Ron Foster MD    Subjective comments: "The bus came early this morning "    Patient/family's goal(s): "To swallow safely and not to end up with aspiration pneumonia again "    Assessments    Patient had a videofluoroscopic swallow study (VFSS) completed on 2018 as recommended by this clinician  The following results were gathered from that assessment:     Moderate oropharyngeal dysphagia c/b premature spillage of nectar thick and honey thick liquids to valleculae, premature spillage to valeculae and pyriforms with deep penetration prior to swallow [with thin liquids]  No spontaneous cough with deep penetration [on thin liquids], weak unproductive cued cough         The SLP, who completed VFSS, recommended puree with NTL as the safest, least restrictive diet/liquid consistency for patient based upon findings  Whole meds in puree, upright posture for meals/meds, no thin water, no ice chips, supervision with PO intake, assistance with PO intake, one bite/sip at a time, and small bites/sips        TREATMENT SESSION:    VitalStim (NMES)     Channel(s) used: 1, 2   Placement: 3b   Frequency: 50 pulses per second   Phase Duration: 200 microseconds   Treatment Duration: 45 minutes   Min mA level: 3 0 mA   Max mA level: 7 0 mA        Initiated exercises to facilitate increased swallow initiation utilizing lemon glycerin swabs  Patient tolerated NMES for 45 minutes in conjunction with PO intake and exercises  (min threshold 3 0 mA to max threshold 7 0 mA)  During today's session, patient consumed pureed banana, blueberry, and raspberry blend and NTL water via teaspoon      Clinician analyzed patient's daughter implementation of safe swallowing strategies as she fed patient during today's session  Initial verbal cue required to place patient in an upright position (90 degree angle) prior to PO intake  Patient benefited from re-positioning in chair at beginning of session to obtain 90 degree angle to promote increased swallowing safety  Patient's daughter independently utilized small bites/sips, slow rate, and alternation of consistencies appropriately throughout feeding  She also engaged the patient throughout PO intake for vocal quality checks due to patient's limited cough/throat clear response  Patient's daughter expressed that she implements the same safe swallow strategies in the home environment with patient  Patient noted to have reduced bolus formation and delayed transfer with no significant oral residue noted  No overt s/s of reduced oral control  Lingual pumping and mild-moderately delayed swallow initiation noted across all consistencies  Mildly reduced HLE noted upon palpatation  No coughing or throat clearing today  No change in vocal quality or respiratory status noted  No other overt distress or s/sx of penetration and/or aspiration were noted on this date of service; however, this does not r/o silent aspiration  Goals    Short-term goals:  1  Patient will tolerate NMES (i e , VitalStim) in conjunction with HLE exercises without overt s/sx of penetration or aspiration (to be achieved in 8-12 weeks)  -- PARTIALLY MET     2  Patient's family will implement compensatory strategies (e g , upright positioning, small bites/sips, slow rate, alternation of consistencies, etc ) when feeding patient with 100% accuracy to increase patient's swallowing safety (to be achieved in 1-2 weeks)  -- MET     3   Patient's family will demonstrate comprehension of the importance of proper oral care to implement with patient with 100% acc to reduce the risk of aspiration pneumonia (to be achieved in 1-2 weeks)  -- PARTIALLY MET     Long-term goals:  1   Patient will receive a videofluoroscopic swallow study (VFSS) to fully assess physiology and anatomy of the swallow and to determine the appropriate diet and/or rehabilitation exercises by discharge  -- MET     2  Patient will maintain adequate hydration and nutrition with optimum safety and efficacy of swallowing function on P O  intake without overt s/sx of penetration or aspiration by discharge  -- PARTIALLY MET    Functional Limitations Reporting (G-codes):   Flowsheet Rows      Most Recent Value   SLP G-Codes   FOTO information reviewed  N/A   Assessment Type  Re-evaluation   Functional Limitations  Swallowing   Swallow Current Status ()  CL   Swallow Goal Status ()  CK        Impressions/Recommendations    Impressions: Patient presents with moderate oropharyngeal dysphagia characterized by reduced bolus formation and delayed transfer with no significant oral residue noted  No overt s/s of reduced oral control  Lingual pumping and mild-moderately delayed swallow initiation noted across all consistencies  Mildly reduced HLE noted upon palpatation  No coughing or throat clearing today  No change in vocal quality or respiratory status noted  No other overt distress or s/sx of penetration and/or aspiration were noted on this date of service; however, this does not r/o silent aspiration  Per VFSS (07/24/2018), "   premature spillage of nectar thick and honey thick liquids to valleculae, premature spillage to valeculae and pyriforms with deep penetration prior to swallow [with thin liquids]  No spontaneous cough with deep penetration [on thin liquids], weak unproductive cued cough   "     Patient's daughter reports that they she is completing increased oral care at home with powered suction system and Listerine  Clinician continued to educate patient's daughter on the recommendation that family look into purchasing toothbrushes with oral rinse, which connect to suction device   Patient continues to have white coating on tongue (improving), which is suspected to be thrush  Recommend that patient f/u with physician for proper dx and treatment  Patient's family has been applying Nystatin rinse with Q-tip  Patient should also consult with a nutritionist, as daughters reported he has lost weight throughout the year  Recommendations:  -Patient would benefit from outpatient skilled Speech Therapy services : Dysphagia therapy    -Frequency: 2x weekly  -Duration: 4-6 weeks    -Intervention certification from: 0/31/1844  -Intervention certification to: 22/11/1762    Visit Tracking:   -Referring provider: Epic  -Billing guidelines: CMS  -Visit #1/10  (total visits: 9)   -[place]  -RE due 09/29/2018

## 2018-08-29 ENCOUNTER — EVALUATION (OUTPATIENT)
Dept: SPEECH THERAPY | Facility: CLINIC | Age: 78
End: 2018-08-29
Payer: MEDICARE

## 2018-08-29 ENCOUNTER — OFFICE VISIT (OUTPATIENT)
Dept: PHYSICAL THERAPY | Facility: CLINIC | Age: 78
End: 2018-08-29
Payer: MEDICARE

## 2018-08-29 DIAGNOSIS — G20 PARKINSON'S DISEASE (HCC): ICD-10-CM

## 2018-08-29 DIAGNOSIS — R13.12 OROPHARYNGEAL DYSPHAGIA: Primary | ICD-10-CM

## 2018-08-29 DIAGNOSIS — G20 PARKINSON'S DISEASE (HCC): Primary | ICD-10-CM

## 2018-08-29 PROCEDURE — G8979 MOBILITY GOAL STATUS: HCPCS | Performed by: PHYSICAL THERAPIST

## 2018-08-29 PROCEDURE — G8978 MOBILITY CURRENT STATUS: HCPCS | Performed by: PHYSICAL THERAPIST

## 2018-08-29 PROCEDURE — G8996 SWALLOW CURRENT STATUS: HCPCS | Performed by: SPEECH-LANGUAGE PATHOLOGIST

## 2018-08-29 PROCEDURE — G8997 SWALLOW GOAL STATUS: HCPCS | Performed by: SPEECH-LANGUAGE PATHOLOGIST

## 2018-08-29 PROCEDURE — 92526 ORAL FUNCTION THERAPY: CPT | Performed by: SPEECH-LANGUAGE PATHOLOGIST

## 2018-08-29 PROCEDURE — 97112 NEUROMUSCULAR REEDUCATION: CPT | Performed by: PHYSICAL THERAPIST

## 2018-08-29 PROCEDURE — 97530 THERAPEUTIC ACTIVITIES: CPT | Performed by: PHYSICAL THERAPIST

## 2018-08-30 NOTE — PROGRESS NOTES
Daily Speech Treatment Note    Today's date: 2018  Patients name: Jenna Rodas  : 1940  MRN: [id number]  Safety measures: PD, dementia, TBI, aspiration precautions (puree with NTL)  Referring provider: Vanesas Damon MD    Primary Diagnosis/Billing code: U87 [id number]  Secondary Diagnosis/ Billing code: [address]    Visit Tracking:  -Referring provider: Epic  -Billing guidelines: CMS  -Visit #2/10  (total visits: 10)  -[place]  -RE due 2018  Subjective/Behavioral:  -"We thought the appointment was at 11:00am " - Whitney Moreno    Objective/Assessment:  -Patient's family member/caregiver was present during today's session  Short-term goals:  1  Patient will tolerate NMES (i e , VitalStim) in conjunction with HLE exercises without overt s/sx of penetration or aspiration (to be achieved in 8-12 weeks)  -- PARTIALLY MET     2  Patient's family will implement compensatory strategies (e g , upright positioning, small bites/sips, slow rate, alternation of consistencies, etc ) when feeding patient with 100% accuracy to increase patient's swallowing safety (to be achieved in 1-2 weeks)  -- MET     3   Patient's family will demonstrate comprehension of the importance of proper oral care to implement with patient with 100% acc to reduce the risk of aspiration pneumonia (to be achieved in 1-2 weeks)  -- PARTIALLY MET    VitalStim (NMES)     Channel(s) used: 1, 2   Placement: 3b   Frequency: 50 pulses per second   Phase Duration: 200 microseconds   Treatment Duration: 45 minutes   Min mA level: 3 5 mA   Max mA level: 7 0 mA        Initiated exercises to facilitate increased swallow initiation utilizing lemon glycerin swabs  Patient tolerated NMES for 45 minutes in conjunction with PO intake and exercises  (min threshold 3 5 mA to max threshold 7 0 mA)  During today's session, patient consumed pureed banana, apple, and pear blend and NTL water via teaspoon      Clinician analyzed patient's daughter implementation of safe swallowing strategies as she fed patient during today's session  Patient benefited from re-positioning in chair at beginning of session to obtain 90 degree angle to promote increased swallowing safety  Patient's daughter independently utilized small bites/sips, slow rate, and alternation of consistencies appropriately throughout feeding  She also engaged the patient throughout PO intake for vocal quality checks due to patient's limited cough/throat clear response  Patient noted to have reduced bolus formation and delayed transfer with no significant oral residue noted  No overt s/s of reduced oral control  Lingual pumping and mild-moderately delayed swallow initiation noted across all consistencies  Mildly reduced HLE noted upon palpatation  No coughing or throat clearing today  No change in vocal quality or respiratory status noted  No other overt distress or s/sx of penetration and/or aspiration were noted on this date of service; however, this does not r/o silent aspiration  Plan:  -Continue with current plan of care

## 2018-08-30 NOTE — PROGRESS NOTES
Daily Note     Today's date: 2018  Patient name: Jenna Rodas  : 1940  MRN: [id number]  Referring provider: Vanessa Damon MD  Dx:   Encounter Diagnosis     ICD-10-CM    1  Parkinson's disease ([facility] Reji Marin new changes since last session reproted    Objective: See treatment diary below      Assessment: Pt was able to complete ambulation this session for5 feet with assist  Therapist suggested daughter attempt seated at EOB at home in order to work on trunk control  Daughter voiced understanding and demonstrated independence  Plan: Continue with [address] activities in sitting and standing  Precautions: PD, fall risk    Specialty Daily Treatment Diary             Daily Note     Today's date: 2018  Patient name: Jenna Rodas  : 1940  MRN: [id number]  Referring provider: Vanessa Damon MD  Dx:   Encounter Diagnosis     ICD-10-CM    1  Parkinson's disease ([facility] ) G20                   Subjective: Daughter reports no new changes    Objective: See treatment diary below    Assessment: Since initial evaluation patient has made little to no progress  Initially his attendance to appointments were infrequent which may have slowed progress but more recently he has been attending therapy regularly  His assistance for tranfsers continues to be in a raza lift, family does not feel safe completing seated at EOB exercise, he continues to not bear weight through R LE which initially he was able to, and he continues to need max assist for sit to stand and for standing  Pt will be placed on hold as he is to have botox injections in the end of September and therapy may resume at that point in order to attempt to improve ROM and possibly eweight bearing        Plan: Continue per POC    Precautions: PD, fall risk    Specialty Daily Treatment Diary     Manual      PROM LE 10 min x15 min x10 min 10 min 5 min knee extension                                       Exercise Diary  8/7 8/9 8/13 8/14    Seated EOM 15 min with reachig 15 min with ball toss and reaching 15 min attempted but unable 15 min with ball toss and reaching min to mod assist    bridges        standing //bars solo 6x for 3-4 min ea with block under right ft 5x with block under right foot for 3-4 min ea 4x with block under right foot no more than 1 min at a time 10 x 30 sec ea    Sit to stand 5x 5x 5x 10x    [facility] reaching R/L        BLE marches, abd, LAQ, DF        Forward ball roll outs        Walking         Knee extension stretch seated 4x60" ea 30"x3                                                                                                  Modalities                                        STG:  Pt will compelte HEP with daughter daily within 4 weeks - MET  Pt will be able to complete sit to stand transfer with min assist within 4 weeks -not met  Pt will be able to stay seated at edge of bed for 5 minutes within 4 weeks - no tmet  Pt will be completing stand pivot transfer with mod assist within 4 weeks - not met    LTG:  Pt will be able to walk short distance with assist through home - not met  Pt will be able to complete all transfers without rzaa lift - not met

## 2018-08-31 ENCOUNTER — OFFICE VISIT (OUTPATIENT)
Dept: SPEECH THERAPY | Facility: CLINIC | Age: 78
End: 2018-08-31
Payer: MEDICARE

## 2018-08-31 ENCOUNTER — APPOINTMENT (OUTPATIENT)
Dept: PHYSICAL THERAPY | Facility: CLINIC | Age: 78
End: 2018-08-31
Payer: MEDICARE

## 2018-08-31 DIAGNOSIS — R13.12 OROPHARYNGEAL DYSPHAGIA: Primary | ICD-10-CM

## 2018-08-31 DIAGNOSIS — G20 PARKINSON'S DISEASE (HCC): ICD-10-CM

## 2018-08-31 PROCEDURE — 92526 ORAL FUNCTION THERAPY: CPT | Performed by: SPEECH-LANGUAGE PATHOLOGIST

## 2018-09-03 NOTE — PROGRESS NOTES
Daily Speech Treatment Note    Today's date: 2018   Patients name: Jeremi Hawkins  : 1940  MRN: [id number]  Safety measures: PD, dementia, TBI, aspiration precautions (puree with NTL)  Referring provider: Gabbie Carter MD    Primary Diagnosis/Billing code: R13 12  Secondary Diagnosis/ Billing code: [address]    Visit Tracking:  -Referring provider: Epic  -Billing guidelines: CMS  -Visit #3/10  (total visits: 11)  -[place]  [place]  -RE due 2018  Subjective/Behavioral:  -"He's tired today " -Natalie    Objective/Assessment:   -Patient's family member/caregiver was present during today's session  Short-term goals:  1  Patient will tolerate NMES (i e , VitalStim) in conjunction with HLE exercises without overt s/sx of penetration or aspiration (to be achieved in 8-12 weeks)  -- PARTIALLY MET     2  Patient's family will implement compensatory strategies (e g , upright positioning, small bites/sips, slow rate, alternation of consistencies, etc ) when feeding patient with 100% accuracy to increase patient's swallowing safety (to be achieved in 1-2 weeks)  -- MET     3   Patient's family will demonstrate comprehension of the importance of proper oral care to implement with patient with 100% acc to reduce the risk of aspiration pneumonia (to be achieved in 1-2 weeks)  -- PARTIALLY MET  -Patient's daughter reported that she has been implementing oral care with Listerine and power suction system, as well as medication (Nystatin)Continued education re: the importance of looking into purchasing suction toothbrushes with mouth rinse  VitalStim (NMES)     Channel(s) used: 1, 2   Placement: 3b   Frequency: 50 pulses per second   Phase Duration: 200 microseconds   Treatment Duration: 45 minutes   Min mA level: 4 0 mA   Max mA level: 9 0 mA        Initiated exercises to facilitate increased swallow initiation utilizing lemon glycerin swabs      Patient tolerated NMES for 45 minutes in conjunction with PO intake and exercises  (min threshold 4 0 mA to max threshold 9 0 mA)  During today's session, patient consumed pureed banana, apple, and pear blend and NTL water via teaspoon  Clinician analyzed patient's daughter implementation of safe swallowing strategies as she fed patient during today's session  Patient benefited from re-positioning in chair to obtain 90 degree angle to promote increased swallowing safety--patient observed to slump in wheelchair  Patient's daughter independently utilized small bites/sips, slow rate, and alternation of consistencies appropriately throughout feeding  She also engaged the patient throughout PO intake for vocal quality checks due to patient's limited cough/throat clear response  Patient noted to have reduced bolus formation and delayed transfer with no significant oral residue noted  No overt s/s of reduced oral control  Lingual pumping and mild-moderately delayed swallow initiation noted across all consistencies  Mildly reduced HLE noted upon palpatation  No coughing or throat clearing today  "Wet" vocal quality noted on vocal quality check--patient unable to follow direction to clear throat; however, when grunting from the pain in his legs, it was noted that patient cleared suspected residue by vocal quality returning to baseline  No other overt distress or s/sx of penetration and/or aspiration were noted on this date of service; however, this does not r/o silent aspiration  Plan:  -Continue with current plan of care

## 2018-09-05 ENCOUNTER — APPOINTMENT (OUTPATIENT)
Dept: PHYSICAL THERAPY | Facility: CLINIC | Age: 78
End: 2018-09-05
Payer: MEDICARE

## 2018-09-05 ENCOUNTER — OFFICE VISIT (OUTPATIENT)
Dept: SPEECH THERAPY | Facility: CLINIC | Age: 78
End: 2018-09-05
Payer: MEDICARE

## 2018-09-05 DIAGNOSIS — G20 PARKINSON'S DISEASE (HCC): ICD-10-CM

## 2018-09-05 DIAGNOSIS — R13.12 OROPHARYNGEAL DYSPHAGIA: Primary | ICD-10-CM

## 2018-09-05 PROCEDURE — 92526 ORAL FUNCTION THERAPY: CPT | Performed by: SPEECH-LANGUAGE PATHOLOGIST

## 2018-09-07 ENCOUNTER — OFFICE VISIT (OUTPATIENT)
Dept: SPEECH THERAPY | Facility: CLINIC | Age: 78
End: 2018-09-07
Payer: MEDICARE

## 2018-09-07 ENCOUNTER — APPOINTMENT (OUTPATIENT)
Dept: PHYSICAL THERAPY | Facility: CLINIC | Age: 78
End: 2018-09-07
Payer: MEDICARE

## 2018-09-07 DIAGNOSIS — G20 PARKINSON'S DISEASE (HCC): ICD-10-CM

## 2018-09-07 DIAGNOSIS — R13.12 OROPHARYNGEAL DYSPHAGIA: Primary | ICD-10-CM

## 2018-09-07 PROCEDURE — 92526 ORAL FUNCTION THERAPY: CPT | Performed by: SPEECH-LANGUAGE PATHOLOGIST

## 2018-09-07 NOTE — PROGRESS NOTES
Daily Speech Treatment Note    Today's date: 2018   Patients name: James Stevenson  : 1940  MRN: [id number]  Safety measures: PD, dementia, TBI, aspiration precautions (puree with NTL)  Referring provider: Shalom Cui MD    Primary Diagnosis/Billing code: R13 12  Secondary Diagnosis/ Billing code: [address]    Visit Tracking:  -Referring provider: Epic  -Billing guidelines: CMS  -Visit #4/10  (total visits: 12) (**KX modifier**)  -[place]  -RE due 2018  Subjective/Behavioral:  -"He's more stiff " -Natalie    Objective/Assessment:   -Patient's family member/caregiver was present during today's session  Short-term goals:  1  Patient will tolerate NMES (i e , VitalStim) in conjunction with HLE exercises without overt s/sx of penetration or aspiration (to be achieved in 8-12 weeks)  -- PARTIALLY MET     2  Patient's family will implement compensatory strategies (e g , upright positioning, small bites/sips, slow rate, alternation of consistencies, etc ) when feeding patient with 100% accuracy to increase patient's swallowing safety (to be achieved in 1-2 weeks)  -- MET     3   Patient's family will demonstrate comprehension of the importance of proper oral care to implement with patient with 100% acc to reduce the risk of aspiration pneumonia (to be achieved in 1-2 weeks)  -- PARTIALLY MET    VitalStim (NMES)     Channel(s) used: 1, 2   Placement: 3b   Frequency: 50 pulses per second   Phase Duration: 200 microseconds   Treatment Duration: 42 minutes   Min mA level: 4 0 mA   Max mA level: 7 0 mA      Patient tolerated NMES for 42 minutes in conjunction with PO intake and exercises  (min threshold 4 0 mA to max threshold 7 0 mA)  During today's session, patient consumed pureed apple & blueberry blend and NTL water via teaspoon  Patient's daughter implemented safe swallowing strategies as she fed patient during today's session   Patient benefited from re-positioning in chair to obtain 90 degree angle to promote increased swallowing safety--patient observed to slump in wheelchair  Patient's daughter independently utilized small bites/sips, slow rate, and alternation of consistencies appropriately throughout feeding  She also engaged the patient throughout PO intake for vocal quality checks due to patient's limited cough/throat clear response  Patient noted to have reduced bolus formation and delayed transfer with no significant oral residue noted  No overt s/s of reduced oral control  Lingual pumping and mild-moderately delayed swallow initiation noted across all consistencies  Mildly reduced HLE noted upon palpatation  No overt distress or s/sx of penetration and/or aspiration were noted on this date of service; however, this does not r/o silent aspiration  Plan:  -Continue with current plan of care

## 2018-09-07 NOTE — PROGRESS NOTES
Daily Speech Treatment Note    Today's date: 9/10/2018   Patients name: Dwight Ricketts  : 1940  MRN: [id number]  Safety measures: PD, dementia, TBI, aspiration precautions (puree with NTL)  Referring provider: Mandy Kirby MD    Primary Diagnosis/Billing code: R13 12  Secondary Diagnosis/ Billing code: [address]    Visit Tracking:  -Referring provider: Epic  -Billing guidelines: CMS  -Visit #5/10  (total visits: 13) (**KX modifier**)  -RE due 2018  Subjective/Behavioral:  -Patient was alert during today's session  Objective/Assessment:   -Patient's family member/caregiver was present during today's session   -Sent a new referral to Dr Darlin Damon requesting a repeat VFSS for patient  Provided José Miguel Edgar with Rx from Dr Darlin Damon and instructions to schedule repeat VFSS  Reciprocal comprehension verbally expressed  Short-term goals:  1  Patient will tolerate NMES (i e , VitalStim) in conjunction with HLE exercises without overt s/sx of penetration or aspiration (to be achieved in 8-12 weeks)  -- PARTIALLY MET     2  Patient's family will implement compensatory strategies (e g , upright positioning, small bites/sips, slow rate, alternation of consistencies, etc ) when feeding patient with 100% accuracy to increase patient's swallowing safety (to be achieved in 1-2 weeks)  -- MET     3   Patient's family will demonstrate comprehension of the importance of proper oral care to implement with patient with 100% acc to reduce the risk of aspiration pneumonia (to be achieved in 1-2 weeks)  -- PARTIALLY MET    VitalStim (NMES)     Channel(s) used: 1, 2   Placement: 3b   Frequency: 50 pulses per second   Phase Duration: 200 microseconds   Treatment Duration: 40 minutes   Min mA level: 3 5 mA   Max mA level: 5 0 mA      Patient tolerated NMES in conjunction with PO intake and exercises  During today's session, patient consumed pureed pears and NTL water via teaspoon      Patient's daughter implemented safe swallowing strategies as she fed patient during today's session  She re-positioned patient in chair to obtain 90 degree angle to promote increased swallowing safety--patient observed to slump in wheelchair  Patient's daughter independently utilized small bites/sips, slow rate, and alternation of consistencies appropriately throughout feeding  She also engaged the patient throughout PO intake for vocal quality checks due to patient's limited cough/throat clear response  Patient noted to have reduced bolus formation and delayed transfer with no significant oral residue noted  No overt s/s of reduced oral control  Lingual pumping and mild-moderately delayed swallow initiation noted across all consistencies  Mildly reduced HLE noted upon palpatation  No overt distress or s/sx of penetration and/or aspiration were noted on this date of service; however, this does not r/o silent aspiration  Plan:  -Continue with current plan of care

## 2018-09-10 ENCOUNTER — OFFICE VISIT (OUTPATIENT)
Dept: SPEECH THERAPY | Facility: CLINIC | Age: 78
End: 2018-09-10
Payer: MEDICARE

## 2018-09-10 ENCOUNTER — APPOINTMENT (OUTPATIENT)
Dept: PHYSICAL THERAPY | Facility: CLINIC | Age: 78
End: 2018-09-10
Payer: MEDICARE

## 2018-09-10 DIAGNOSIS — G20 PARKINSON'S DISEASE (HCC): ICD-10-CM

## 2018-09-10 DIAGNOSIS — R13.12 OROPHARYNGEAL DYSPHAGIA: Primary | ICD-10-CM

## 2018-09-10 PROCEDURE — 92526 ORAL FUNCTION THERAPY: CPT | Performed by: SPEECH-LANGUAGE PATHOLOGIST

## 2018-09-11 NOTE — PROGRESS NOTES
Daily Speech Treatment Note    Today's date: 2018   Patients name: Josette Llanes  : 1940  MRN: [id number]  Safety measures: PD, dementia, TBI, aspiration precautions (puree with NTL)  Referring provider: Aquiles Ramírez MD    Primary Diagnosis/Billing code: R13 12  Secondary Diagnosis/ Billing code: [address]    Visit Tracking:  -Referring provider: Epic  -Billing guidelines: CMS  -Visit #610  (total visits: 14) (**KX modifier**)   -RE due 2018  Subjective/Behavioral:   -Patient had increased pain at end of session--legs were becoming sore per patient daughter report  Objective/Assessment:  -Patient's family member/caregiver was present during today's session  Short-term goals:  1  Patient will tolerate NMES (i e , VitalStim) in conjunction with HLE exercises without overt s/sx of penetration or aspiration (to be achieved in 8-12 weeks)  -- PARTIALLY MET     2  Patient's family will implement compensatory strategies (e g , upright positioning, small bites/sips, slow rate, alternation of consistencies, etc ) when feeding patient with 100% accuracy to increase patient's swallowing safety (to be achieved in 1-2 weeks)  -- MET     3   Patient's family will demonstrate comprehension of the importance of proper oral care to implement with patient with 100% acc to reduce the risk of aspiration pneumonia (to be achieved in 1-2 weeks)  -- PARTIALLY MET    VitalStim (NMES)     Channel(s) used: 1, 2   Placement: 3b   Frequency: 50 pulses per second   Phase Duration: 200 microseconds   Treatment Duration: 40 minutes   Min mA level: 4 5 mA   Max mA level: 5 5 mA      Patient tolerated NMES in conjunction with PO intake and exercises  During today's session, patient consumed pureed bananas and NTL water via teaspoon  Patient's daughter implemented safe swallowing strategies as she fed patient during today's session   She re-positioned patient in chair to obtain 90 degree angle to promote increased swallowing safety  Patient's daughter independently utilized small bites/sips, slow rate, and alternation of consistencies appropriately throughout feeding  She also engaged the patient throughout PO intake for vocal quality checks due to patient's limited cough/throat clear response  Patient noted to have reduced bolus formation and delayed transfer with no significant oral residue noted  No overt s/s of reduced oral control  Lingual pumping and mild-moderately delayed swallow initiation noted across all consistencies  Mildly reduced HLE  No overt distress or s/sx of penetration and/or aspiration were noted on this date of service; however, this does not r/o silent aspiration  Plan:  -Continue with current plan of care

## 2018-09-12 ENCOUNTER — OFFICE VISIT (OUTPATIENT)
Dept: SPEECH THERAPY | Facility: CLINIC | Age: 78
End: 2018-09-12
Payer: MEDICARE

## 2018-09-12 ENCOUNTER — APPOINTMENT (OUTPATIENT)
Dept: PHYSICAL THERAPY | Facility: CLINIC | Age: 78
End: 2018-09-12
Payer: MEDICARE

## 2018-09-12 DIAGNOSIS — G20 PARKINSON'S DISEASE (HCC): ICD-10-CM

## 2018-09-12 DIAGNOSIS — R13.12 OROPHARYNGEAL DYSPHAGIA: Primary | ICD-10-CM

## 2018-09-12 PROCEDURE — 92526 ORAL FUNCTION THERAPY: CPT | Performed by: SPEECH-LANGUAGE PATHOLOGIST

## 2018-09-13 NOTE — PROGRESS NOTES
Daily Speech Treatment Note    Today's date: 2018   Patients name: Reggie Ling  : 1940  MRN: [id number]  Safety measures: PD, dementia, TBI, aspiration precautions (puree with NTL)  Referring provider: Jose L Cameron MD    Primary Diagnosis/Billing code: R13 12  Secondary Diagnosis/ Billing code: [address]    Visit Tracking:  -Referring provider: Epic  -Billing guidelines: CMS  -Visit #7/10  (total visits: 15) (**KX modifier**)  -RE due 2018  Subjective/Behavioral:   -Patient had heightened alertness today  Objective/Assessment:  -Patient's family member/caregiver was present during today's session  Short-term goals:  1  Patient will tolerate NMES (i e , VitalStim) in conjunction with HLE exercises without overt s/sx of penetration or aspiration (to be achieved in 8-12 weeks)  -- PARTIALLY MET     2  Patient's family will implement compensatory strategies (e g , upright positioning, small bites/sips, slow rate, alternation of consistencies, etc ) when feeding patient with 100% accuracy to increase patient's swallowing safety (to be achieved in 1-2 weeks)  -- MET     3   Patient's family will demonstrate comprehension of the importance of proper oral care to implement with patient with 100% acc to reduce the risk of aspiration pneumonia (to be achieved in 1-2 weeks)  -- PARTIALLY MET    VitalStim (NMES)     Channel(s) used: 1, 2   Placement: 3b   Frequency: 50 pulses per second   Phase Duration: 200 microseconds   Treatment Duration: 35 minutes   Min mA level: 4 5 mA   Max mA level: 7 5 mA      Patient tolerated NMES in conjunction with PO intake and exercises  During today's session, patient consumed pureed pears and NTL water via teaspoon  Patient's daughter re-positioned patient in chair to obtain 90 degree angle to promote increased swallowing safety at the start of session   Patient's daughter independently utilized small bites/sips, slow rate, and alternation of consistencies appropriately throughout feeding  She also engaged the patient throughout PO intake for vocal quality checks due to patient's limited cough/throat clear response  During PO intake, patient noted to have reduced bolus formation and delayed transfer with no significant oral residue noted  No overt s/s of reduced oral control  Lingual pumping and mildly delayed swallow initiation noted across all consistencies  Patient required one verbal cue today to initiate swallow  Mildly reduced HLE  No overt distress or s/sx of penetration and/or aspiration were noted on this date of service; however, this does not r/o silent aspiration  Plan:  -Continue with current plan of care

## 2018-09-17 ENCOUNTER — APPOINTMENT (OUTPATIENT)
Dept: PHYSICAL THERAPY | Facility: CLINIC | Age: 78
End: 2018-09-17
Payer: MEDICARE

## 2018-09-17 ENCOUNTER — OFFICE VISIT (OUTPATIENT)
Dept: SPEECH THERAPY | Facility: CLINIC | Age: 78
End: 2018-09-17
Payer: MEDICARE

## 2018-09-17 DIAGNOSIS — R13.12 OROPHARYNGEAL DYSPHAGIA: Primary | ICD-10-CM

## 2018-09-17 DIAGNOSIS — G20 PARKINSON'S DISEASE (HCC): ICD-10-CM

## 2018-09-17 PROCEDURE — 92526 ORAL FUNCTION THERAPY: CPT | Performed by: SPEECH-LANGUAGE PATHOLOGIST

## 2018-09-18 ENCOUNTER — TELEPHONE (OUTPATIENT)
Dept: NEUROLOGY | Facility: CLINIC | Age: 78
End: 2018-09-18

## 2018-09-18 NOTE — TELEPHONE ENCOUNTER
agata from [facility] central scheduling called requesting video barrium swallow script be faxed to [phone]    Script faxed

## 2018-09-19 ENCOUNTER — OFFICE VISIT (OUTPATIENT)
Dept: SPEECH THERAPY | Facility: CLINIC | Age: 78
End: 2018-09-19
Payer: MEDICARE

## 2018-09-19 ENCOUNTER — APPOINTMENT (OUTPATIENT)
Dept: PHYSICAL THERAPY | Facility: CLINIC | Age: 78
End: 2018-09-19
Payer: MEDICARE

## 2018-09-19 DIAGNOSIS — R13.12 OROPHARYNGEAL DYSPHAGIA: Primary | ICD-10-CM

## 2018-09-19 DIAGNOSIS — G20 PARKINSON'S DISEASE (HCC): ICD-10-CM

## 2018-09-19 PROCEDURE — 92526 ORAL FUNCTION THERAPY: CPT | Performed by: SPEECH-LANGUAGE PATHOLOGIST

## 2018-09-19 NOTE — PROGRESS NOTES
Daily Speech Treatment Note    Today's date: 2018  Patients name: Twyla Franco  : 1940  MRN: [id number]  Safety measures: PD, dementia, TBI, aspiration precautions (puree with NTL)  Referring provider: Simon Banks MD    Primary Diagnosis/Billing code: R13 12  Secondary Diagnosis/ Billing code: [address]    Visit Tracking:  -Referring provider: Epic  -Billing guidelines: CMS  -Visit #8/10  (total visits: 16) (**KX modifier**)  -RE due 2018  Subjective/Behavioral:   -Patient with reduced attention at start of session  Objective/Assessment:  -Patient's family member/caregiver was present during today's session  Short-term goals:  1  Patient will tolerate NMES (i e , VitalStim) in conjunction with HLE exercises without overt s/sx of penetration or aspiration (to be achieved in 8-12 weeks)  -- PARTIALLY MET     2  Patient's family will implement compensatory strategies (e g , upright positioning, small bites/sips, slow rate, alternation of consistencies, etc ) when feeding patient with 100% accuracy to increase patient's swallowing safety (to be achieved in 1-2 weeks)  -- MET     3   Patient's family will demonstrate comprehension of the importance of proper oral care to implement with patient with 100% acc to reduce the risk of aspiration pneumonia (to be achieved in 1-2 weeks)  -- PARTIALLY MET  -Presented patient's daughter with continued education on oral care recommendation  VitalStim (NMES)     Channel(s) used: 1, 2   Placement: 3b   Frequency: 50 pulses per second   Phase Duration: 200 microseconds   Treatment Duration: 40 minutes   Min mA level: 5 0 mA   Max mA level: 8 5 mA      Initiated exercises to facilitate increased swallow initiation utilizing lemon glycerin swabs  Patient tolerated NMES in conjunction with PO intake and exercises  During today's session, patient consumed pureed bananas and NTL water via teaspoon      At start of session, patient's daughter re-positioned patient in chair to obtain 90 degree angle to promote increased swallowing safety  Patient's daughter independently utilized small bites/sips, slow rate, and alternation of consistencies appropriately throughout feeding  She also engaged the patient throughout PO intake for vocal quality checks due to patient's limited cough/throat clear response  During PO intake, patient noted to have reduced bolus formation and delayed transfer with no significant oral residue noted  No overt s/s of reduced oral control  Lingual pumping and mildly delayed swallow initiation noted across all consistencies  Mildly reduced HLE  No overt distress or s/sx of penetration and/or aspiration were noted on this date of service; however, this does not r/o silent aspiration  Plan:  -Continue with current plan of care

## 2018-09-26 ENCOUNTER — APPOINTMENT (OUTPATIENT)
Dept: PHYSICAL THERAPY | Facility: CLINIC | Age: 78
End: 2018-09-26
Payer: MEDICARE

## 2018-09-26 ENCOUNTER — OFFICE VISIT (OUTPATIENT)
Dept: SPEECH THERAPY | Facility: CLINIC | Age: 78
End: 2018-09-26
Payer: MEDICARE

## 2018-09-26 DIAGNOSIS — R13.12 OROPHARYNGEAL DYSPHAGIA: Primary | ICD-10-CM

## 2018-09-26 DIAGNOSIS — G20 PARKINSON'S DISEASE (HCC): ICD-10-CM

## 2018-09-26 PROCEDURE — 92526 ORAL FUNCTION THERAPY: CPT | Performed by: SPEECH-LANGUAGE PATHOLOGIST

## 2018-09-26 NOTE — PROGRESS NOTES
Daily Speech Treatment Note    Today's date: 2018  Patients name: Twyla Franco  : 1940  MRN: [id number]  Safety measures: PD, dementia, TBI, aspiration precautions (puree with NTL)  Referring provider: Simon Banks MD    Primary Diagnosis/Billing code: R13 12  Secondary Diagnosis/ Billing code: [address]    Visit Tracking:  -Referring provider: Epic  -Billing guidelines: CMS  -Visit #9/10  (total visits: 17) (**KX modifier**)  -RE due 2018  Subjective/Behavioral:   -Patient with reduced attention at start of session     -Patient's daughter reported that she reduced patient's Sinemet medication to 2x/day and discharged Baclofen medication secondary to patient having increased "laziness" and "stiffness"  Patient's daughter was educated to f/u with physician re: medication recommendation  Objective/Assessment:  -Patient's family member/caregiver was present during today's session  Short-term goals:  1  Patient will tolerate NMES (i e , VitalStim) in conjunction with HLE exercises without overt s/sx of penetration or aspiration (to be achieved in 8-12 weeks)  -- PARTIALLY MET     2  Patient's family will implement compensatory strategies (e g , upright positioning, small bites/sips, slow rate, alternation of consistencies, etc ) when feeding patient with 100% accuracy to increase patient's swallowing safety (to be achieved in 1-2 weeks)  -- MET     3   Patient's family will demonstrate comprehension of the importance of proper oral care to implement with patient with 100% acc to reduce the risk of aspiration pneumonia (to be achieved in 1-2 weeks)  -- PARTIALLY MET  -Presented patient's daughter with continued education on oral care recommendation      VitalStim (NMES)     Channel(s) used: 1, 2   Placement: 3b   Frequency: 50 pulses per second   Phase Duration: 200 microseconds   Treatment Duration: 40 minutes   Min mA level: 4 5 mA   Max mA level: 6 5 mA      Initiated exercises to facilitate increased swallow initiation utilizing lemon glycerin swabs  Patient tolerated NMES in conjunction with PO intake and exercises  During today's session, patient consumed pureed apples and NTL water via teaspoon  Patient's daughter re-positioned patient in chair to obtain 90 degree angle to promote increased swallowing safety  Patient's daughter independently utilized small bites/sips, slow rate, and alternation of consistencies appropriately throughout feeding  She also engaged the patient throughout PO intake for vocal quality checks due to patient's limited cough/throat clear response  During PO intake, patient noted to have reduced bolus formation and delayed transfer with no significant oral residue noted  No overt s/s of reduced oral control  Lingual pumping and mildly delayed swallow initiation noted across all consistencies  Mildly reduced HLE  No overt distress or s/sx of penetration and/or aspiration were noted on this date of service; however, this does not r/o silent aspiration  Plan:  -Continue with current plan of care

## 2018-09-27 NOTE — PROGRESS NOTES
Speech-Language Pathology Discharge    Today's date: 2018  Patients name: Luba Blankenship  : 1940  MRN: [id number]  Safety measures: PD, dementia, TBI, aspiration precautions (puree with NTL)  Referring provider: Carrol Dominguez MD    Visit Tracking:   -Referring provider: Epic  -Billing guidelines: CMS  -Visit #1/10  (total visits: 18) (**KX modifier**)     Subjective comments: Patient was alert during today's session  Patient/family's goal(s): "To swallow safely and not to end up with aspiration pneumonia again "    Assessments    LIQUID CONSISTENCY TESTIN  Liquid Consistency (Thin and Nectar-thick)   Administered by: Fed by daughter   Strategies, attempts, and responses: Other: Small sips -- effective in increasing patient's swallowing safety    CLINICAL FINDINGS:   Oral phase impairments: Anterior-posterior transit, Bolus formation/control, Piecemeal deglutition and Tongue pumping   Pharyngeal Phase Impairments: Swallow initiation Mild delay    *Laryngeal excursion upon palpation: Appeared WFL      SOLID CONSISTENCY TESTIN   Solid Consistency (Puree)   Administered by: Fed by daugther   Strategies, attempts, and responses: Other: Small bites -- effective in increasing patient's swallowing safety    CLINICAL FINDINGS:   Oral phase impairments: Anterior-posterior transit, Bolus formation/control, Piecemeal deglutition and Tongue pumping   Pharyngeal Phase Impairments: Swallow initiation Mild delay    *Laryngeal excursion upon palpation: Appeared WFL      SWALLOWING DIAGNOSTIC IMPRESSION:  -Swallowing diagnosis/severity: Moderate oropharyngeal phase dysphagia    -Factors affecting performance: Mental Status, Following directions and Endurance    -Safety concerns: Risk for aspiration, Risk for choking and Risk for inadequate nutrition/ hydration    -Risk factors: Impaired cognitive status/ dementia, Progressive neurological disease, Complex medical history and History of aspiration pneumonia    SAFETY PRECAUTIONS:  -Supervision: Feed by Caregiver    -Strategies: Small sips and bites when eating, Slow rate, swallow between bites, Alternate liquids and solids and Other (foods/liquids via teaspoon)    -Positioning: Upright position at least 30 minutes after meal and Upright position during meals    -Recommend (solids): Puree    -Recommend (liquids): Thin (per [facility] videofluoroscopic swallow study (VFSS) evaluating SLP Koby Goncalves)--it was reported that patient was safe on thin liquids during VFSS ) **However, per patient's daughter's report, she noted that patient had increased coughing on thin liquids during mealtime following VFSS  When liquid was made into NTL consistency, per patient daughter report, no overt s/sx of penetration and/or aspiration  ** This clinician spoke directly with Zakiya Villanueva (SLP) and patient's daughter Gonzálezsascha Paxton re: recommendations made today based upon report of recent VFSS and family report  If caregivers are noticing that patient is coughing/choking on thin liquids or has any symptoms of respiratory change/fever (aspiration precautions), it is recommended that patient's liquids be made to NTL consistency  Patient's daughter was presented with verbal/visual education on this recommendation and she is in agreement at this time  Also, following proper oral care, patient may have thin liquids outside of meals/snacks (at least 30 mins afterward) Delma Cook [employer] Protocol")  Patient's daughter was presented with verbal/visual education on this--reciprocal comprehension verbally expressed  Goals    Short-term goals:   1  Patient will tolerate NMES (i e , VitalStim) in conjunction with HLE exercises without overt s/sx of penetration or aspiration (to be achieved in 8-12 weeks)   -- MET     2  Patient's family will implement compensatory strategies (e g , upright positioning, small bites/sips, slow rate, alternation of consistencies, etc ) when feeding patient with 100% accuracy to increase patient's swallowing safety (to be achieved in 1-2 weeks)  -- MET     3   Patient's family will demonstrate comprehension of the importance of proper oral care to implement with patient with 100% acc to reduce the risk of aspiration pneumonia (to be achieved in 1-2 weeks)  -- PARTIALLY MET     Long-term goals:  1  Patient will receive a videofluoroscopic swallow study (VFSS) to fully assess physiology and anatomy of the swallow and to determine the appropriate diet and/or rehabilitation exercises by discharge  -- MET     2  Patient will maintain adequate hydration and nutrition with optimum safety and efficacy of swallowing function on P O  intake without overt s/sx of penetration or aspiration by discharge  -- MET    Functional Limitations Reporting (G-codes):   Flowsheet Rows      Most Recent Value   SLP G-Codes   FOTO information reviewed  N/A   Assessment Type  Discharge   Functional Limitations  Swallowing   Swallow Goal Status ()  CK   Swallow Discharge Status ()  CK        Impressions/Recommendations    Impressions: Patient presents with overall moderate oropharyngeal dysphagia c/b reduced bolus formation and delayed AP transfer with no significant oral residue noted  Lingual pumping and mild delayed swallow initiation noted across all consistencies  Mildly reduced HLE noted upon palpatation  No coughing or throat clearing noted during today's session  No change in vocal quality or respiratory status noted  No other overt distress or s/sx of penetration and/or aspiration were noted on this date of service; however, this does not r/o silent aspiration  Patient's daughter reported that she completes patient's oral care at home with powered suction system and Listerine  Clinician has provided her with maximum education re: purchasing toothbrushes with oral rinse (H2O2-peroxide), which connect to oral suction device   Patient continues to have white coating on tongue (improving), which is suspected to be thrush  Recommend that patient f/u with physician for proper dx and treatment  Patient's family has been applying Nystatin rinse with Q-tip and suctioning with system  Patient should also consult with a nutritionist, as daughters reported he has lost weight throughout the year  Recommendations:  -Patient to be discharged from outpatient skilled Speech Therapy services: Patient to be discharged to a Home Exercise Program with assistance by trained caregiver(s)   Patient made good progress toward goals in care and has reached maximum potential

## 2018-09-28 ENCOUNTER — PROCEDURE VISIT (OUTPATIENT)
Dept: NEUROLOGY | Facility: CLINIC | Age: 78
End: 2018-09-28
Payer: MEDICARE

## 2018-09-28 ENCOUNTER — EVALUATION (OUTPATIENT)
Dept: SPEECH THERAPY | Facility: CLINIC | Age: 78
End: 2018-09-28
Payer: MEDICARE

## 2018-09-28 ENCOUNTER — APPOINTMENT (OUTPATIENT)
Dept: PHYSICAL THERAPY | Facility: CLINIC | Age: 78
End: 2018-09-28
Payer: MEDICARE

## 2018-09-28 VITALS — SYSTOLIC BLOOD PRESSURE: 129 MMHG | TEMPERATURE: 97.7 F | DIASTOLIC BLOOD PRESSURE: 69 MMHG

## 2018-09-28 DIAGNOSIS — R13.12 OROPHARYNGEAL DYSPHAGIA: Primary | ICD-10-CM

## 2018-09-28 DIAGNOSIS — R25.2 SPASTICITY: Primary | ICD-10-CM

## 2018-09-28 DIAGNOSIS — G20 PARKINSON'S DISEASE (HCC): ICD-10-CM

## 2018-09-28 PROCEDURE — 64644 CHEMODENERV 1 EXTREM 5/> MUS: CPT | Performed by: PHYSICAL MEDICINE & REHABILITATION

## 2018-09-28 PROCEDURE — G8997 SWALLOW GOAL STATUS: HCPCS | Performed by: SPEECH-LANGUAGE PATHOLOGIST

## 2018-09-28 PROCEDURE — 92526 ORAL FUNCTION THERAPY: CPT | Performed by: SPEECH-LANGUAGE PATHOLOGIST

## 2018-09-28 PROCEDURE — G8998 SWALLOW D/C STATUS: HCPCS | Performed by: SPEECH-LANGUAGE PATHOLOGIST

## 2018-09-28 PROCEDURE — 64645 CHEMODENERV 1 EXTREM 5/> EA: CPT | Performed by: PHYSICAL MEDICINE & REHABILITATION

## 2018-09-28 PROCEDURE — 95874 GUIDE NERV DESTR NEEDLE EMG: CPT | Performed by: PHYSICAL MEDICINE & REHABILITATION

## 2018-09-28 NOTE — PATIENT INSTRUCTIONS
1  Botox injections performed today  2  Continue home stretching and exercise program  Appropriate stretching activities discussed at today's visit  3  Continue current oral baclofen dose  Follow-up with Dr Halina Albright for dose adjustments  4  He should follow-up in ~4 weeks to evaluate botox injection effects, then 3 months from today's injection

## 2018-09-28 NOTE — H&P
PHYSICAL MEDICINE AND REHABILITATION SPASTICITY CLINIC - INJECTION NOTE  Adal Andrade 77 y o  male MRN: [id number]  Encounter: [id number]     Date of Service: 09/28/18     Diagnosis: Spastic Paraparesis    Assessment:   Luba Blankenship is a 77 y o  male with a history of pain and muscle spasms in the both lower extremity(ies)   from traumatic brain injury who is being seen in clinic today for initiation of Botox injections  Risks and benefits of injections were explained to the patient and daughter, who wishes to proceed today  As christiane is non-english speaking, daughter performed translations  She denies any recent hospitalizations, flu-like symptoms, or recent Botox injections elsewhere  She denies any CP or SOB today  Plan:  1  Botox injections performed today, see below for details  2  Continue home stretching and exercise program  Appropriate stretching activities discussed at today's visit  3  Continue current oral baclofen dose  Follow-up with Dr Agnisezka Yanes for dose adjustments  4  He should follow-up in ~4 weeks to evaluate botox injection effects, then 3 months from today's injection  Rigo Anaya MD MPH  Physical Medicine and Rehabilitation    Indication for today's injection:   Daughter reports patient has abnormal positioning, increased pain, increased stiffness, decreased active and passive range of motion, increased spasticity and increased spasms particularly of the bilateral LEs  He has pain located in the both lower extremity(ies)    The pain is aggravated by stretching and alleviated by nothing        Medications:    Current Outpatient Prescriptions:     acetaminophen (TYLENOL) 500 mg tablet, Take 500 mg by mouth every 6 (six) hours as needed for mild pain, Disp: , Rfl:     aspirin (ECOTRIN LOW STRENGTH) 81 mg EC tablet, Take 81 mg by mouth daily, Disp: , Rfl:     baclofen 5 MG TABS, Take 1 tablet by mouth QHS x 5 days and if tolerated increase to 1 tablet by mouth BID x 5 days, then increase to 1 tablet by mouth TID, Disp: 90 tablet, Rfl: 3    carbidopa-levodopa (SINEMET)  mg per tablet, Take 1 tablet by mouth 3 (three) times a day, Disp: , Rfl:     lansoprazole (PREVACID SOLUTAB) 30 mg disintegrating tablet, Take 30 mg by mouth daily, Disp: , Rfl:     levothyroxine 50 mcg tablet, , Disp: , Rfl:     OMEPRAZOLE PO, Take 30 mg by mouth daily, Disp: , Rfl:     tamsulosin (FLOMAX) 0 4 mg, , Disp: , Rfl:     tiotropium (SPIRIVA) 18 mcg inhalation capsule, Place 18 mcg into inhaler and inhale daily, Disp: , Rfl:     VITAMIN D, CHOLECALCIFEROL, PO, Take by mouth daily, Disp: , Rfl:     Current Facility-Administered Medications:     onabotulinumtoxin A (BOTOX) injection 600 Units, 600 Units, Intramuscular, Once, Diane Good MD    Review of Systems: (per daughter, as patient is non-english speaking)  ENT ROS: negative  Respiratory ROS: no cough, shortness of breath, or wheezing  Cardiovascular ROS: no chest pain or dyspnea on exertion  Gastrointestinal ROS: no abdominal pain, change in bowel habits, or black or bloody stools  Musculoskeletal ROS: positive for - increase in stiffness/spasticity in bilateral LEs    Physical Exam:  /69   Temp 97 7 °F (36 5 °C)     General: alert, no apparent distress, cooperative and comfortable  Head: Normal, normocephalic, atraumatic  Eye: Normal external eye, conjunctiva, lids   Ears: Normal external ears  Nose: Normal external nose, mucus membranes  Pharynx: Dental Hygiene adequate  Normal buccal mucosa  Normal pharynx  Pulmonary: no retractions, no abnormal respiratory pattern, chest expansion normal  Abdomen: soft, nontender, nondistended, no masses or organomegaly  Skin/Extremity: no rashes, no erythema, no peripheral edema  Neurologic: patient is awake, and alert  Will nod in agreement with daughter's queries       Gait Exam:  Patient does not ambulate         Passive Range of Motion (PROM) and Anitha Scale (AXEL):  Revised Anitha Tardieu Scale (RATS) Key:  0:  No increase in muscle tone  1:  Slight increase in tone manifested by a "catch" followed by release  2:  Mild increase in tone  3:  Moderate increase in tone  4:  Severe increase in tone  *: <10s clonus  **: >10s clonus  R1: Angle first catch  R2: End range of motion  Patient (seated) for arm testing  Patient (seated) for leg testing  Right PROM R1 Right AXEL  0-4 Right PROM R2 Left PROM R1 Left AXEL  0-4  Left PROM R2   ARMS         Shoulder adductors:   0° to 180°         Elbow flexors:   150° to 0°         Elbow extensors:   0° to 150°         Forearm pronators:   90° P to 90° S         Wrist flexors:   90° F to 90° E         Finger flexors:  90° F to 0°         LEGS         Hip flexors:  120° F to 30° E  2   2    Hip adductors:   30° Add to 50° Abd  3   3    Knee extensors:   0° to 135°         Knee flexors:   135° to 0°  4   4    Ankle plantar flexors:   50° PF to 20° DF  2   2    Ankle invertors:   40° Inv to 20° Ev           Strength:  Right Left Site Right Left Site   4 4 S Ab:  Shoulder Abductors * * HF:  Hip Flexors   4 4 EF:  Elbow Flexors * * H Ab: Hip Abductors   4 4 EE:  Elbow Extensors * * KF: Knee Flexors   4 4 WE:  Wrist Extensors * * KE:  Knee Extensors   4 4 FF:  Finger Flexors 2 2 DR:  Dorsi Flexors   4 3 HI:  Hand Intrinsics 3 3 PF:  Plantar Flexors   *limited by tone    Procedure:    Maykel Abreu is a 77 y o  male with Spastic Paraparesis  He is being seen in clinic today for increased pain or muscle spasms of his both lower extremity(ies)    Given the focal nature of his increased tone and poor response to oral medications, I feel that he is a good candidate for Botox injection today  He will need 510 units of Botox to be divided between the muscles listed below      The goal of the injections will be decreased spasms, decreased spasticity, decreased pain, increased active and passive range of motion, improved ability to facilitate dressing, hygiene and improved position in the wheelchair  The procedure was explained to patient and family  Informed consent was obtained after the potential risks and benefits had been explained to the patient and family  A consent form was signed  Potential risks include: pain, bruising, bleeding, injection, flu-like symptoms, over-weakening of injected or adjacent muscles, and/or swallowing dysfunction  Using a 27 guage 1 5 inch needle (37mm x 27G), aseptic technique and EMG guidance to accurately identify and quantify motor unit overactivity, the following muscles were identified and injected with Botox which was diluted with preservative free saline as outlined in the table below:    BOTOX INJECTION NUMBER:  1                          RIGHT LOWER EXTREMITY        Adductors Brevis and longus (2 separate muscle groups) 2:1 75u 3 2+ EMG   Rectus Femoris 2:1 30 2 2+ EMG   MED HAMS 2:1 75 3 2+ EMG   LAT HAMS 2:1 75 3 2+ EMG       LEFT LOWER EXTREMITY        Adductors Brevis and longus (2 separate muscle groups) 2:1 75u 3 2+ EMG   Rectus Femoris 2:1 30u 2 2+ EMG   MED HAMS 2:1 100u 4 2+ EMG   LAT HAMS 2:1 50u 2 2+ EMG           Total Units Utilized:  510u      Total Units Discarded:  90u        EMG was performed and medically necessary to accurately identify the muscles that were injected, to confirm motor unit overactivity and to quantify said overactivity  With accurate muscle identification, the patient received the maximum benefit from the least amount of Botox  By quantifying motor unit overactivity, the Botox dose was adjusted to the degree of overactivity  Accurate muscle localization and quantification of motor unit overactivity is not possible without the use of EMG  Patient tolerated the procedure without any difficulty and there were no complications

## 2018-10-22 ENCOUNTER — TELEPHONE (OUTPATIENT)
Dept: NEUROLOGY | Facility: CLINIC | Age: 78
End: 2018-10-22

## 2018-10-22 NOTE — TELEPHONE ENCOUNTER
Spoke to patient and rescheduled appt on 12/28/18 with Dr Boothe Falling to 01/04/2018 with Dr Boothe Falling due to Dr Boothe Falling not being in the office

## 2018-11-07 ENCOUNTER — OFFICE VISIT (OUTPATIENT)
Dept: NEUROLOGY | Facility: CLINIC | Age: 78
End: 2018-11-07
Payer: MEDICARE

## 2018-11-07 VITALS — RESPIRATION RATE: 12 BRPM | SYSTOLIC BLOOD PRESSURE: 122 MMHG | HEART RATE: 92 BPM | DIASTOLIC BLOOD PRESSURE: 66 MMHG

## 2018-11-07 DIAGNOSIS — G82.20 PARAPARESIS (HCC): ICD-10-CM

## 2018-11-07 DIAGNOSIS — R25.2 SPASTICITY: ICD-10-CM

## 2018-11-07 DIAGNOSIS — S06.9X9S TRAUMATIC BRAIN INJURY WITH LOSS OF CONSCIOUSNESS, SEQUELA (HCC): Primary | ICD-10-CM

## 2018-11-07 PROCEDURE — 99214 OFFICE O/P EST MOD 30 MIN: CPT | Performed by: PHYSICAL MEDICINE & REHABILITATION
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
## 2018-11-14 DIAGNOSIS — G82.20 SPASTIC DIPLEGIA, ACQUIRED, LOWER EXTREMITY (HCC): ICD-10-CM

## 2018-11-14 RX ORDER — BACLOFEN 5 MG/1
5 TABLET ORAL 3 TIMES DAILY
Qty: 90 TABLET | Refills: 3 | Status: SHIPPED | OUTPATIENT
Start: 2018-11-14 | End: 2018-11-29

## 2018-11-29 ENCOUNTER — OFFICE VISIT (OUTPATIENT)
Dept: NEUROLOGY | Facility: CLINIC | Age: 78
End: 2018-11-29
Payer: MEDICARE

## 2018-11-29 VITALS — DIASTOLIC BLOOD PRESSURE: 56 MMHG | SYSTOLIC BLOOD PRESSURE: 118 MMHG | HEART RATE: 81 BPM

## 2018-11-29 DIAGNOSIS — G24.8 ACQUIRED TORSION DYSTONIA: ICD-10-CM

## 2018-11-29 DIAGNOSIS — F03.91 DEMENTIA WITH BEHAVIORAL DISTURBANCE, UNSPECIFIED DEMENTIA TYPE (HCC): ICD-10-CM

## 2018-11-29 DIAGNOSIS — G82.20 ACQUIRED SPASTIC DIPLEGIA OF LOWER EXTREMITIES (HCC): ICD-10-CM

## 2018-11-29 DIAGNOSIS — G20 ATYPICAL PARKINSONISM (HCC): Primary | ICD-10-CM

## 2018-11-29 PROCEDURE — 99205 OFFICE O/P NEW HI 60 MIN: CPT | Performed by: PSYCHIATRY & NEUROLOGY

## 2018-11-29 RX ORDER — LANOLIN ALCOHOL/MO/W.PET/CERES
CREAM (GRAM) TOPICAL
Refills: 5 | COMMUNITY
Start: 2018-10-25 | End: 2019-09-04 | Stop reason: SDUPTHER

## 2018-11-29 RX ORDER — TIZANIDINE 2 MG/1
2 TABLET ORAL 3 TIMES DAILY
Qty: 30 TABLET | Refills: 5 | Status: SHIPPED | OUTPATIENT
Start: 2018-11-29 | End: 2019-03-20
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
## 2018-11-29 NOTE — PATIENT INSTRUCTIONS
· Check on PD mimics and deficiencies contributing to symptoms  · Obtain MRI brain w/wo contrast NeuroQuant for evaluating both his dementia spasticity, and question about atypical parkinsonism  · Start on tizanidine 2mg tablet, take 1 tab q8h  Previous baclofen was too sedating on low dose and without benefit  · Recommended to continue the plan with botulinum injections as arranged in Jan 2019, followed by PT and braces as per Rehab if LEs continue showing improvement  · Will hold off restarting Sinemet at this time, as TID dosing historically made his leg flexion / spasticity worse  · Thank you very much for sending me this interesting patient  · The patient has been instructed to call us about any new neurological problems or medication side effects  · Return to Clinic in 8 weeks after botox injection and above completed

## 2018-11-29 NOTE — PROGRESS NOTES
Review of Systems   Constitutional: Negative  Negative for appetite change and fever  HENT: Negative  Negative for hearing loss, tinnitus, trouble swallowing and voice change  Eyes: Negative  Negative for photophobia and pain  Respiratory: Negative  Negative for shortness of breath  Cardiovascular: Negative  Negative for palpitations  Gastrointestinal: Negative  Negative for nausea  Endocrine: Negative  Negative for cold intolerance and heat intolerance  Genitourinary: Negative  Negative for dysuria, frequency and urgency  Musculoskeletal: Negative  Negative for myalgias and neck pain  Skin: Negative  Allergic/Immunologic: Negative  Neurological: Negative  Negative for dizziness, seizures, syncope, facial asymmetry, speech difficulty, weakness and numbness  Hematological: Negative  Does not bruise/bleed easily  Psychiatric/Behavioral: Negative  Negative for confusion and hallucinations

## 2018-12-07 ENCOUNTER — APPOINTMENT (OUTPATIENT)
Dept: LAB | Facility: HOSPITAL | Age: 78
End: 2018-12-07
Attending: PSYCHIATRY & NEUROLOGY
Payer: MEDICARE

## 2018-12-07 DIAGNOSIS — G20 ATYPICAL PARKINSONISM (HCC): ICD-10-CM

## 2018-12-07 DIAGNOSIS — G82.20 ACQUIRED SPASTIC DIPLEGIA OF LOWER EXTREMITIES (HCC): ICD-10-CM

## 2018-12-07 LAB
ALBUMIN SERPL BCP-MCNC: 4 G/DL (ref 3–5.2)
ALP SERPL-CCNC: 66 U/L (ref 43–122)
ALT SERPL W P-5'-P-CCNC: 11 U/L (ref 9–52)
ANION GAP SERPL CALCULATED.3IONS-SCNC: 10 MMOL/L (ref 5–14)
AST SERPL W P-5'-P-CCNC: 92 U/L (ref 17–59)
BILIRUB SERPL-MCNC: 0.7 MG/DL
BUN SERPL-MCNC: 14 MG/DL (ref 5–25)
CALCIUM SERPL-MCNC: 9.2 MG/DL (ref 8.4–10.2)
CHLORIDE SERPL-SCNC: 102 MMOL/L (ref 97–108)
CO2 SERPL-SCNC: 24 MMOL/L (ref 22–30)
CREAT SERPL-MCNC: 0.89 MG/DL (ref 0.7–1.5)
GFR SERPL CREATININE-BSD FRML MDRD: 81 ML/MIN/1.73SQ M
GLUCOSE P FAST SERPL-MCNC: 92 MG/DL (ref 70–99)
POTASSIUM SERPL-SCNC: 4.9 MMOL/L (ref 3.6–5)
PROT SERPL-MCNC: 8.3 G/DL (ref 5.9–8.4)
SODIUM SERPL-SCNC: 136 MMOL/L (ref 137–147)
VIT B12 SERPL-MCNC: 753 PG/ML (ref 100–900)

## 2018-12-07 PROCEDURE — 82607 VITAMIN B-12: CPT

## 2018-12-07 PROCEDURE — 36415 COLL VENOUS BLD VENIPUNCTURE: CPT

## 2018-12-07 PROCEDURE — 82525 ASSAY OF COPPER: CPT

## 2018-12-07 PROCEDURE — 80053 COMPREHEN METABOLIC PANEL: CPT

## 2018-12-07 PROCEDURE — 82306 VITAMIN D 25 HYDROXY: CPT

## 2018-12-07 PROCEDURE — 82390 ASSAY OF CERULOPLASMIN: CPT

## 2018-12-08 LAB — CERULOPLASMIN SERPL-MCNC: 29.4 MG/DL (ref 16–31)

## 2018-12-12 LAB
25(OH)D2 SERPL-MCNC: <1 NG/ML
25(OH)D3 SERPL-MCNC: 80 NG/ML
25(OH)D3+25(OH)D2 SERPL-MCNC: 80 NG/ML
COPPER SERPL-MCNC: 130 UG/DL (ref 72–166)

## 2018-12-13 ENCOUNTER — TELEPHONE (OUTPATIENT)
Dept: NEUROLOGY | Facility: CLINIC | Age: 78
End: 2018-12-13

## 2018-12-13 NOTE — TELEPHONE ENCOUNTER
I spoke with Dr Nancy Ferrer on the phone, and agree that with the level of encephalomalacia evident on the CT Scan already this may already demonstrate evidence for severe dementia without need for quantification at this time  If MRI is still indicated in the future, would try to see if retinal implants are compatible and contact Radiology again - it would still likely cause signal disruption to a significant part of the left frontal images in that case though  Will cancel MRI at this time  Please inform patient

## 2018-12-13 NOTE — TELEPHONE ENCOUNTER
Dr Breanna Morris states call got disconnected and he was unable to speak with Dr Rosalva Carlos Apo in with patient now but requests call back number for Dr Rosalva Ryan  Call placed to reading room, they said the best contact number is for the reading room since the radiologists move around  Reading room P: [phone] and ask for Dr Rosalva Ryan, he will be in today until 5  Please update nursing if we need to contact patient informing him of cancellation of MRI and further instruction

## 2018-12-13 NOTE — TELEPHONE ENCOUNTER
Received call from June, MRI tech stating that Dr Lenin Coughlin, radiologist, reviewed CT imaging and states that he feels it is unsafe for patient to have MRI r/t his hx of orbital surgery and that it wouldn't be diagnostic because of the metal from the surgery  She states that Dr David Hernandez can reach out to Dr Sarah Coppola at [address] [place] [address] reading room with any questions  Will contact Dr David Hernandez for further instruction

## 2018-12-13 NOTE — TELEPHONE ENCOUNTER
Spoke with Dr Neda Bass who would like to speak with Dr Sukh Luke connected with Dr Neda Bass at this time

## 2018-12-14 NOTE — TELEPHONE ENCOUNTER
Message left on patient's wife's voicemail requesting return phone call with use of interpretive services ( number [phone])  I also called patient's daughter Theersa Leyva who stated they were already at the MRI appt  I apologized profusely that we did not notify her in time and she was very understanding  I reviewed reasoning below as to why MRI was cancelled and she verbalized clear understanding

## 2018-12-18 ENCOUNTER — TELEPHONE (OUTPATIENT)
Dept: NEUROLOGY | Facility: CLINIC | Age: 78
End: 2018-12-18

## 2018-12-18 NOTE — TELEPHONE ENCOUNTER
Pt's dtr Rubi Pelton called and advised of all of the below  She verbalized clear understanding of all instructions

## 2018-12-18 NOTE — TELEPHONE ENCOUNTER
----- Message from Benjie Jain MD sent at 12/16/2018  2:24 PM EST -----  Regarding: Lab results, diagnosis and plan  Reviewed his lab workup at this time and unremarkable - though one of his liver enzymes was elevated but not the other in the CMP  This is of uncertain significance, but probably an error given the sample tested was hemolyzed  His diagnosis remains the same at this point  Again, given his ocular implant we were unable to obtain the MRI and he likely has an atypical parkinsonism with strong support for a frontal temporal dementia as well  They should continue the previous plan as before       Please ask them if the tizanidine is working or not      ----- Message -----  From: Lab, Background User  Sent: 12/7/2018  12:38 PM  To: Benjie Jain MD

## 2019-01-04 ENCOUNTER — PROCEDURE VISIT (OUTPATIENT)
Dept: NEUROLOGY | Facility: CLINIC | Age: 79
End: 2019-01-04
Payer: MEDICARE

## 2019-01-04 VITALS — SYSTOLIC BLOOD PRESSURE: 122 MMHG | TEMPERATURE: 96.9 F | DIASTOLIC BLOOD PRESSURE: 68 MMHG

## 2019-01-04 DIAGNOSIS — R25.2 SPASTICITY: Primary | ICD-10-CM

## 2019-01-04 PROCEDURE — 64644 CHEMODENERV 1 EXTREM 5/> MUS: CPT | Performed by: PHYSICAL MEDICINE & REHABILITATION

## 2019-01-04 PROCEDURE — 95874 GUIDE NERV DESTR NEEDLE EMG: CPT | Performed by: PHYSICAL MEDICINE & REHABILITATION

## 2019-01-04 PROCEDURE — 64645 CHEMODENERV 1 EXTREM 5/> EA: CPT | Performed by: PHYSICAL MEDICINE & REHABILITATION

## 2019-01-04 NOTE — PATIENT INSTRUCTIONS
1  Botox injections performed today  2  Continue home stretching and exercise program  3  Continue follow-up with Dr Wesly Camargo in PM&R clinic for therapy, bracing, DME needs  4  He should follow-up in ~4 weeks to evaluate botox injection effects, then 3 months from today's injection

## 2019-01-04 NOTE — H&P
[facility] - INJECTION NOTE  Adal Perdomo 77 y o  male MRN: [id number]  Encounter: [id number]     Date of Service: 01/04/19     Diagnosis: Spastic Paraparesis    Assessment:   Kamilla Thompson is a 77 y o  male with a history of pain and muscle spasms in the both lower extremity(ies)   from traumatic brain injury who is being seen in clinic today for repeat Botox injections  Risks and benefits of injections were explained to the patient and daughter, who wishes to proceed today  As patient is non-english speaking, daughter performed translations  She denies any recent hospitalizations, flu-like symptoms, or recent Botox injections elsewhere  She denies any CP or SOB today  Per daughter, previous injections were helpful in that they allowed ease of washing patient as well as ease of transfer  She reports pateint was recently started on tizanidine which has helped his tone significantly, although higher doses do tend to make patient more somnolent; currently he is taking twice a day  Plan:  1  Botox injections performed today, see below for details  2  Continue home stretching and exercise program  Appropriate stretching activities discussed at today's visit  3  Continue follow-up with Dr Luca Denney in PM&R clinic for therapy, bracing, DME needs  4  He should follow-up in ~4 weeks to evaluate botox injection effects, then 3 months from today's injection  Napoleon Hernandez MD MPH  Physical Medicine and Rehabilitation    Indication for today's injection:   Daughter reports patient has abnormal positioning, increased pain, increased stiffness, decreased active and passive range of motion, increased spasticity and increased spasms particularly of the bilateral LEs  He has pain located in the both lower extremity(ies)    The pain is aggravated by stretching and alleviated by nothing        Medications:    Current Outpatient Prescriptions:     acetaminophen (TYLENOL) 500 mg tablet, Take 500 mg by mouth every 6 (six) hours as needed for mild pain, Disp: , Rfl:     aspirin (ECOTRIN LOW STRENGTH) 81 mg EC tablet, Take 81 mg by mouth daily, Disp: , Rfl:     carbidopa-levodopa (SINEMET)  mg per tablet, Take 1 tablet by mouth 3 (three) times a day, Disp: , Rfl:     lansoprazole (PREVACID SOLUTAB) 30 mg disintegrating tablet, Take 30 mg by mouth daily, Disp: , Rfl:     levothyroxine 50 mcg tablet, , Disp: , Rfl:     magnesium Oxide (MAG-OX) 400 mg TABS, , Disp: , Rfl: 5    OMEPRAZOLE PO, Take 30 mg by mouth daily, Disp: , Rfl:     tamsulosin (FLOMAX) 0 4 mg, , Disp: , Rfl:     tiotropium (SPIRIVA) 18 mcg inhalation capsule, Place 18 mcg into inhaler and inhale daily, Disp: , Rfl:     tiZANidine (ZANAFLEX) 2 mg tablet, Take 1 tablet (2 mg total) by mouth 3 (three) times a day, Disp: 30 tablet, Rfl: 5    VITAMIN D, CHOLECALCIFEROL, PO, Take by mouth daily, Disp: , Rfl:   No current facility-administered medications for this visit  Review of Systems: (per daughter, as patient is non-english speaking)  ENT ROS: negative  Respiratory ROS: no cough, shortness of breath, or wheezing  Cardiovascular ROS: no chest pain or dyspnea on exertion  Gastrointestinal ROS: no abdominal pain, change in bowel habits, or black or bloody stools  Musculoskeletal ROS: positive for - increase in stiffness/spasticity in bilateral LEs    Physical Exam:  /68 (BP Location: Left arm, Patient Position: Sitting, Cuff Size: Standard)   Temp (!) 96 9 °F (36 1 °C)     General: alert, no apparent distress, cooperative and comfortable  Head: Normal, normocephalic, atraumatic  Eye: Normal external eye, conjunctiva, lids   Ears: Normal external ears  Nose: Normal external nose, mucus membranes  Pharynx: Dental Hygiene adequate  Normal buccal mucosa  Normal pharynx    Pulmonary: no retractions, no abnormal respiratory pattern, chest expansion normal  Abdomen: soft, nontender, nondistended, no masses or organomegaly  Skin/Extremity: no rashes, no erythema, no peripheral edema  Neurologic: patient is awake, and alert  Will nod in agreement with daughter's queries  Gait Exam:  Patient does not ambulate         Passive Range of Motion (PROM) and Anitha Scale (AXEL):  Revised Anitha Tardieu Scale (RATS) Key:  0:  No increase in muscle tone  1:  Slight increase in tone manifested by a "catch" followed by release  2:  Mild increase in tone  3:  Moderate increase in tone  4:  Severe increase in tone  *: <10s clonus  **: >10s clonus  R1: Angle first catch  R2: End range of motion  Patient (seated) for arm testing  Patient (seated) for leg testing  Right PROM R1 Right AXEL  0-4 Right PROM R2 Left PROM R1 Left AXEL  0-4  Left PROM R2   ARMS         Shoulder adductors:   0° to 180°         Elbow flexors:   150° to 0°         Elbow extensors:   0° to 150°         Forearm pronators:   90° P to 90° S         Wrist flexors:   90° F to 90° E         Finger flexors:  90° F to 0°         LEGS         Hip flexors:  120° F to 30° E  2   2    Hip adductors:   30° Add to 50° Abd  3   3    Knee extensors:   0° to 135°         Knee flexors:   135° to 0°  4   4    Ankle plantar flexors:   50° PF to 20° DF  2   2    Ankle invertors:   40° Inv to 20° Ev           Strength:  Right Left Site Right Left Site   4 4 S Ab:  Shoulder Abductors * * HF:  Hip Flexors   4 4 EF:  Elbow Flexors * * H Ab: Hip Abductors   4 4 EE:  Elbow Extensors * * KF: Knee Flexors   4 4 WE:  Wrist Extensors * * KE:  Knee Extensors   4 4 FF:  Finger Flexors 2 2 DR:  Dorsi Flexors   4 3 HI:  Hand Intrinsics 3 3 PF:  Plantar Flexors   *limited by tone    Procedure:    Dolores Lofton is a 77 y o  male with Spastic Paraparesis  He is being seen in clinic today for increased pain or muscle spasms of his both lower extremity(ies)     Given the focal nature of his increased tone and poor response to oral medications, I feel that he is a good candidate for Botox injection today  He will need 510 units of Botox to be divided between the muscles listed below  The goal of the injections will be decreased spasms, decreased spasticity, decreased pain, increased active and passive range of motion, improved ability to facilitate dressing, hygiene and improved position in the wheelchair  The procedure was explained to patient and family  Informed consent was obtained after the potential risks and benefits had been explained to the patient and family  A consent form was signed  Potential risks include: pain, bruising, bleeding, injection, flu-like symptoms, over-weakening of injected or adjacent muscles, and/or swallowing dysfunction  Using a 27 guage 1 5 inch needle (37mm x 27G), aseptic technique and EMG guidance to accurately identify and quantify motor unit overactivity, the following muscles were identified and injected with Botox which was diluted with preservative free saline as outlined in the table below:    BOTOX INJECTION NUMBER:  2                          RIGHT LOWER EXTREMITY        Adductors Brevis and longus (2 separate muscle groups) 2:1 75u 3 2+ EMG   Rectus Femoris 2:1 30 2 2+ EMG   MED HAMS 2:1 100 3 2+ EMG   LAT HAMS 2:1 100 3 2+ EMG       LEFT LOWER EXTREMITY        Adductors Brevis and longus (2 separate muscle groups) 2:1 75u 3 2+ EMG   Rectus Femoris 2:1 30u 2 2+ EMG   MED HAMS 2:1 80u 4 2+ EMG   LAT HAMS 2:1 50u 2  2+ EMG           Total Units Utilized:  540u      Total Units Discarded:  60u        EMG was performed and medically necessary to accurately identify the muscles that were injected, to confirm motor unit overactivity and to quantify said overactivity  With accurate muscle identification, the patient received the maximum benefit from the least amount of Botox  By quantifying motor unit overactivity, the Botox dose was adjusted to the degree of overactivity    Accurate muscle localization and quantification of motor unit overactivity is not possible without the use of EMG  Patient tolerated the procedure without any difficulty and there were no complications

## 2019-01-18 ENCOUNTER — TELEPHONE (OUTPATIENT)
Dept: NEUROLOGY | Facility: CLINIC | Age: 79
End: 2019-01-18

## 2019-01-18 NOTE — TELEPHONE ENCOUNTER
Unlikely to be due to injection as injections were on 1/04  However, recommend daughter ice area  If worsens or does not improve in the next 5 days, please inform her to make appointment with PCP       Mei Peguero MD MPH  Physical Medicine and Rehabilitation

## 2019-01-18 NOTE — TELEPHONE ENCOUNTER
Daughter states r/knee @site of botox injection that there is 2x2" puffy area noted, it is not red, knee is warm to touch, no red streaks noted, no fevers, no drainage  Symptoms noted approx 2days ago    Please advise
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
## 2019-03-20 ENCOUNTER — OFFICE VISIT (OUTPATIENT)
Dept: NEUROLOGY | Facility: CLINIC | Age: 79
End: 2019-03-20
Payer: MEDICARE

## 2019-03-20 VITALS — SYSTOLIC BLOOD PRESSURE: 136 MMHG | DIASTOLIC BLOOD PRESSURE: 65 MMHG | HEART RATE: 77 BPM

## 2019-03-20 DIAGNOSIS — G24.8 ACQUIRED TORSION DYSTONIA: ICD-10-CM

## 2019-03-20 DIAGNOSIS — G82.20 ACQUIRED SPASTIC DIPLEGIA OF LOWER EXTREMITIES (HCC): ICD-10-CM

## 2019-03-20 DIAGNOSIS — G20 ATYPICAL PARKINSONISM (HCC): Primary | ICD-10-CM

## 2019-03-20 PROCEDURE — 99214 OFFICE O/P EST MOD 30 MIN: CPT | Performed by: PSYCHIATRY & NEUROLOGY

## 2019-03-20 RX ORDER — TIZANIDINE 4 MG/1
8 TABLET ORAL 2 TIMES DAILY
Qty: 360 TABLET | Refills: 3 | Status: SHIPPED | OUTPATIENT
Start: 2019-03-20 | End: 2019-10-09 | Stop reason: SDUPTHER

## 2019-03-20 NOTE — PATIENT INSTRUCTIONS
Sending script for tizanidine 4mg tablets to be taken according to the following: At any point if symptoms are controlled then can hold that dose without going up further  If feels any side effects (sedation) that are intolerable, then go back down to next lower dose and hold there, then call us  Week 1:  Take 1 tab twice a day   Week 2:  Take 1 5 tablets twice a day   Week 3:  Take 2 tab twice a day   If the tizanidine does not help, will consider Artane titration  · Recommended to continue the plan with botulinum injections as arranged in Jan 2019, followed by PT and braces as per Rehab if LEs continue showing improvement  · Will hold off restarting Sinemet at this time, as TID dosing historically made his leg flexion / spasticity worse  · The patient has been instructed to call us about any new neurological problems or medication side effects    · Return to Clinic in 12 weeks

## 2019-03-20 NOTE — PROGRESS NOTES
Review of Systems   Constitutional: Negative  Negative for appetite change and fever  HENT: Negative  Negative for hearing loss, tinnitus, trouble swallowing and voice change  Eyes: Negative  Negative for photophobia and pain  Respiratory: Negative  Negative for shortness of breath  Cardiovascular: Negative  Negative for palpitations  Gastrointestinal: Negative  Negative for nausea and vomiting  Endocrine: Negative  Negative for cold intolerance and heat intolerance  Genitourinary: Negative  Negative for dysuria, frequency and urgency  Musculoskeletal: Negative  Negative for myalgias and neck pain  Muscle pain ( right leg)   Skin: Negative  Negative for rash  Neurological: Negative  Negative for dizziness, tremors, seizures, syncope, facial asymmetry, speech difficulty, weakness, light-headedness, numbness and headaches  Hematological: Negative  Does not bruise/bleed easily  Psychiatric/Behavioral: Negative  Negative for confusion, hallucinations and sleep disturbance
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
## 2019-04-09 ENCOUNTER — TELEPHONE (OUTPATIENT)
Dept: NEUROLOGY | Facility: CLINIC | Age: 79
End: 2019-04-09

## 2019-04-09 ENCOUNTER — OFFICE VISIT (OUTPATIENT)
Dept: NEUROLOGY | Facility: CLINIC | Age: 79
End: 2019-04-09
Payer: MEDICARE

## 2019-04-09 VITALS — SYSTOLIC BLOOD PRESSURE: 114 MMHG | DIASTOLIC BLOOD PRESSURE: 66 MMHG

## 2019-04-09 DIAGNOSIS — R13.10 DYSPHAGIA: ICD-10-CM

## 2019-04-09 DIAGNOSIS — G82.20 ACQUIRED SPASTIC DIPLEGIA OF LOWER EXTREMITIES (HCC): ICD-10-CM

## 2019-04-09 DIAGNOSIS — S06.9X9A TBI (TRAUMATIC BRAIN INJURY) (HCC): Primary | ICD-10-CM

## 2019-04-09 PROCEDURE — 99214 OFFICE O/P EST MOD 30 MIN: CPT | Performed by: PHYSICAL MEDICINE & REHABILITATION

## 2019-04-10 ENCOUNTER — DOCUMENTATION (OUTPATIENT)
Dept: NEUROLOGY | Facility: CLINIC | Age: 79
End: 2019-04-10

## 2019-05-16 ENCOUNTER — PROCEDURE VISIT (OUTPATIENT)
Dept: NEUROLOGY | Facility: CLINIC | Age: 79
End: 2019-05-16
Payer: MEDICARE

## 2019-05-16 VITALS — HEART RATE: 84 BPM | DIASTOLIC BLOOD PRESSURE: 68 MMHG | SYSTOLIC BLOOD PRESSURE: 160 MMHG

## 2019-05-16 DIAGNOSIS — G82.20 SPASTIC DIPLEGIA, ACQUIRED, LOWER EXTREMITY (HCC): Primary | ICD-10-CM

## 2019-05-16 PROCEDURE — 95874 GUIDE NERV DESTR NEEDLE EMG: CPT | Performed by: PHYSICAL MEDICINE & REHABILITATION

## 2019-05-16 PROCEDURE — 64645 CHEMODENERV 1 EXTREM 5/> EA: CPT | Performed by: PHYSICAL MEDICINE & REHABILITATION

## 2019-05-16 PROCEDURE — 64644 CHEMODENERV 1 EXTREM 5/> MUS: CPT | Performed by: PHYSICAL MEDICINE & REHABILITATION

## 2019-07-25 ENCOUNTER — DOCUMENTATION (OUTPATIENT)
Dept: NEUROLOGY | Facility: CLINIC | Age: 79
End: 2019-07-25

## 2019-07-25 NOTE — PROGRESS NOTES
General 07/24/2019  9:32 AM Maritza Newton MA CARE  COODINATION -   Note    BOTOX 600 UNITS - NO AUTH NEEDED    STOCK  Susan Lazo

## 2019-10-09 DIAGNOSIS — G82.20 ACQUIRED SPASTIC DIPLEGIA OF LOWER EXTREMITIES (HCC): ICD-10-CM

## 2019-10-09 DIAGNOSIS — G24.8 ACQUIRED TORSION DYSTONIA: ICD-10-CM

## 2019-10-09 RX ORDER — TIZANIDINE 4 MG/1
TABLET ORAL
Qty: 360 TABLET | Refills: 7 | Status: SHIPPED | OUTPATIENT
Start: 2019-10-09 | End: 2019-12-04 | Stop reason: ALTCHOICE
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
## 2019-12-04 ENCOUNTER — OFFICE VISIT (OUTPATIENT)
Dept: NEUROLOGY | Facility: CLINIC | Age: 79
End: 2019-12-04
Payer: MEDICARE

## 2019-12-04 VITALS — DIASTOLIC BLOOD PRESSURE: 72 MMHG | HEART RATE: 84 BPM | SYSTOLIC BLOOD PRESSURE: 132 MMHG

## 2019-12-04 DIAGNOSIS — G24.8 ACQUIRED TORSION DYSTONIA: ICD-10-CM

## 2019-12-04 DIAGNOSIS — G82.20 ACQUIRED SPASTIC DIPLEGIA OF LOWER EXTREMITIES (HCC): Primary | ICD-10-CM

## 2019-12-04 DIAGNOSIS — G20 ATYPICAL PARKINSONISM (HCC): ICD-10-CM

## 2019-12-04 PROCEDURE — 99214 OFFICE O/P EST MOD 30 MIN: CPT | Performed by: PSYCHIATRY & NEUROLOGY

## 2019-12-04 RX ORDER — LEVOTHYROXINE SODIUM 0.03 MG/1
TABLET ORAL
COMMUNITY
End: 2020-08-11

## 2019-12-04 RX ORDER — DIPHENOXYLATE HYDROCHLORIDE AND ATROPINE SULFATE 2.5; .025 MG/1; MG/1
TABLET ORAL
COMMUNITY
Start: 2018-03-14

## 2019-12-04 NOTE — PROGRESS NOTES
Review of Systems   Constitutional: Negative  Negative for appetite change and fever  HENT: Positive for trouble swallowing  Negative for hearing loss, tinnitus and voice change  Eyes: Negative  Negative for photophobia and pain  Respiratory: Negative  Negative for shortness of breath  Cardiovascular: Negative  Negative for palpitations  Gastrointestinal: Negative  Negative for nausea and vomiting  Endocrine: Negative  Negative for cold intolerance and heat intolerance  Genitourinary: Negative  Negative for dysuria, frequency and urgency  Musculoskeletal: Positive for myalgias  Negative for neck pain  Skin: Negative  Negative for rash  Neurological: Positive for speech difficulty (slurred speech )  Negative for dizziness, tremors, seizures, syncope, facial asymmetry, weakness, light-headedness, numbness and headaches  Memory problems  Hematological: Negative  Does not bruise/bleed easily  Psychiatric/Behavioral: Negative  Negative for confusion, hallucinations and sleep disturbance

## 2019-12-04 NOTE — PATIENT INSTRUCTIONS
· We will continue to monitor then for any further changes in status  He will be up out of chair for transfers and exercise as tolerated  Considered Artane titration for dystonia and parkinsonism, given his poor response to levodopa  However the risk due to his age of cognitive impairment is high  We will hold off on this  · He will hold off further botulinum injections per their preference  · The patient has been instructed to call us about any new neurological problems or medication side effects    · Return to Clinic prn per their preference

## 2019-12-04 NOTE — LETTER
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 note  The full review of systems was negative except as stated in HPI and below  **patient unable to endorse any of the below and is somnolent  Daughter endorsed instead  Constitutional: Negative  Negative for appetite change and fever  HENT: Positive for trouble swallowing  Negative for hearing loss, tinnitus and voice change  Eyes: Negative  Negative for photophobia and pain  Respiratory: Negative  Negative for shortness of breath  Cardiovascular: Negative  Negative for palpitations  Gastrointestinal: Negative  Negative for nausea and vomiting  Endocrine: Negative  Negative for cold intolerance and heat intolerance  Genitourinary: Negative  Negative for dysuria, frequency and urgency  Musculoskeletal: Positive for myalgias  Negative for neck pain  Skin: Negative  Negative for rash  Neurological: Positive for speech difficulty (slurred speech )  Negative for dizziness, tremors, seizures, syncope, facial asymmetry, weakness, light-headedness, numbness and headaches  Memory problems  Hematological: Negative  Does not bruise/bleed easily  Psychiatric/Behavioral: Negative  Negative for confusion, hallucinations and sleep disturbance       FOCUSED PHYSICAL EXAMINATION:     Vital signs:  /72 (BP Location: Right arm, Patient Position: Sitting, Cuff Size: Standard)   Pulse 84     General:  Wheelchair-bound, drowsy, thin, patient in no acute distress  Head:  Normocephalic, +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 due to language barrier  Cranial Nerves:  CN II:  Direct and consensual light reflexes were equally reactive to light symmetrically  No afferent pupillary defect   Visual fields are full to confrontation  CN III / IV / VI: Extraocular movements were full, with normal pursuit and saccades  Did not detect a gaze palsy or slowed saccades on testing  CN V:   Facial sensation to light touch was intact  CN VII: Face appears symmetric with normal strength  CN VIII: Hearing was not assessed   CN X:   Palate is up going bilaterally and symmetrically  CN XI:  Neck muscles are strong  CN XII: Tongue protrusion is at midline with normal movements  Motor:    Dystonia: bilateral toe curling, +excessive adduction of both hips L>>R and painful contraction with knee flexion bilaterally (L side max extension 70 degrees, R extension 90 degrees below  Spasticity: unable to extend either leg without causing pain  No velocity dependence detected  +very increased tone in both hamstrings and hip adductors   Patient was very sensitive to any passive movement made on the extremities and yelled out often  [id number]                Time since last dose:    3/20/19   12/4/19   Speech  4   4   Facial Expression  3 3   Postural Tremor (Right) 0 0   Postural Tremor (Left) 0 0   Kinetic Tremor (Right)  0 0   Kinetic Tremor (Left)  0 0   Rest tremor amplitude RUE 2 wrist flex-ext 1 in the finger   Rest tremor amplitude LUE 0 0   Rest tremor amplitude RLE 0 0   Rest tremor amplitude LLE 0 0   Lip/Jaw Tremor  0 0   Consistency of tremor 2 1   Finger Taps (Right)   unable to participate unable to participate   Finger Taps (Left)  unable to participate unable to participate   Hand Movement (Right)  unable to participate 3   Hand Movement (Left)   unable to participate 3   Pronation/Supination (Right)  unable to participate unable to participate   Pronation/Supination (Left)   unable to participate unable to participate   Toe Tapping (Right) unable to perform unable to participate   Toe Tapping (Left) unable to perform unable to participate   Leg Agility (Right)  unable to perform due to contraction of both legs unable to participate   Leg Agility (Left)   unable to perform  unable to participate   Rigidity - Neck  3   4   Rigidity - Upper Extremity (Right)  3    4   Rigidity - Upper Extremity (Left)   3   4   Rigidity - Lower Extremity (Right)  4 4   Rigidity - Lower Extremity (Left)   4 4   Arising from Chair   4   4 wheelchair bound    Gait    4 wheelchair bound   4 wheelchair bound   Freezing of Gait -   4 wheelchair bound   Postural Stability  4 wheelchair bound   -   Posture 3   4 wheelchair bound   Global spontaneity of movement 4   4 wheelchair bound     -------------------------------------------------------------------------------------    Unable to voluntarily test muscle groups, but both arms were at least 3/5 antigravity  Legs he did not raise or move well apart from slight wiggling  Sensory  Responds to light touch in all extremities        Gait: wheelchair bound

## 2020-01-23 DIAGNOSIS — G82.20 ACQUIRED SPASTIC DIPLEGIA OF LOWER EXTREMITIES (HCC): ICD-10-CM

## 2020-01-23 DIAGNOSIS — G24.8 ACQUIRED TORSION DYSTONIA: ICD-10-CM

## 2020-01-23 RX ORDER — TIZANIDINE 4 MG/1
TABLET ORAL
Qty: 360 TABLET | Refills: 0 | OUTPATIENT
Start: 2020-01-23

## 2020-01-23 NOTE — TELEPHONE ENCOUNTER
Declining refill request for tizanidine from pharmacy given last clinic note history said it was discontinued by daughter because patient felt no benefit on it   The team did not restart this good

## 2020-03-04 PROBLEM — E88.09 HYPOALBUMINEMIA DUE TO PROTEIN-CALORIE MALNUTRITION (HCC): Status: ACTIVE | Noted: 2020-03-04

## 2020-03-04 PROBLEM — J96.01 ACUTE HYPOXEMIC RESPIRATORY FAILURE (HCC): Status: ACTIVE | Noted: 2020-03-04

## 2020-03-04 PROBLEM — E46 HYPOALBUMINEMIA DUE TO PROTEIN-CALORIE MALNUTRITION (HCC): Status: ACTIVE | Noted: 2020-03-04

## 2020-03-13 ENCOUNTER — APPOINTMENT (OUTPATIENT)
Dept: WOUND CARE | Facility: HOSPITAL | Age: 80
End: 2020-03-13
Payer: MEDICARE

## 2020-03-13 PROCEDURE — 11042 DBRDMT SUBQ TIS 1ST 20SQCM/<: CPT

## 2020-03-13 PROCEDURE — 97597 DBRDMT OPN WND 1ST 20 CM/<: CPT

## 2020-03-13 PROCEDURE — 99213 OFFICE O/P EST LOW 20 MIN: CPT

## 2021-04-20 NOTE — TELEPHONE ENCOUNTER
Anticoagulation Progress Note    Indication: Atrial fibrillation  Goal INR: 2-3  INR Result: 3.2  Lab result from [facility]    Tried calling patient multiple times. Left message to call us back to confirm understanding of dose.     82 year old male     Assessment  (-) bleeding, bruising or thromboembolic complications  (-) missed/extra warfarin doses  (-) medication changes  (-) dietary changes  (-) alcohol  (-) smoking  (-) activity level changes  (-) hospital visits, ER visits, interruptions in therapy  (-) illness/infection, injuries, or falls: patient had some slight diarrhea a few days ago    Plan   -Pt's INR=3.2  today, which is above the desired therapeutic range of 2-3.  -Pt's INR has decreased from 3.6 at the last visit.    -Pt has been taking warfarin 27.5 mg weekly.  -Plan to have the pt continue with the current warfarin regimen.  Pt to skip tonight 4/20/21  Recheck in 3 weeks May 11  Tried calling patient multiple times. Left messages to call us back to confirm understanding of dose.    Reviewed CT Head results without new abnormalities with patients daughter who is also one of his primary care givers  Questions answered        Filiberto Amos MD

## 2023-01-10 ENCOUNTER — TELEPHONE (OUTPATIENT)
Dept: LAB | Facility: HOSPITAL | Age: 83
End: 2023-01-10

## 2023-01-27 ENCOUNTER — APPOINTMENT (OUTPATIENT)
Dept: LAB | Facility: HOSPITAL | Age: 83
End: 2023-01-27

## 2023-01-27 DIAGNOSIS — E55.9 VITAMIN D DEFICIENCY: ICD-10-CM

## 2023-01-27 DIAGNOSIS — E03.9 ACQUIRED HYPOTHYROIDISM: ICD-10-CM

## 2023-01-27 DIAGNOSIS — E78.2 MIXED HYPERLIPIDEMIA: ICD-10-CM

## 2023-01-27 LAB
25(OH)D3 SERPL-MCNC: 68.8 NG/ML (ref 30–100)
ALBUMIN SERPL BCP-MCNC: 3.3 G/DL (ref 3.5–5)
ALP SERPL-CCNC: 66 U/L (ref 46–116)
ALT SERPL W P-5'-P-CCNC: 23 U/L (ref 12–78)
ANION GAP SERPL CALCULATED.3IONS-SCNC: 5 MMOL/L (ref 4–13)
AST SERPL W P-5'-P-CCNC: 15 U/L (ref 5–45)
BASOPHILS # BLD AUTO: 0.03 THOUSANDS/ÂΜL (ref 0–0.1)
BASOPHILS NFR BLD AUTO: 1 % (ref 0–1)
BILIRUB SERPL-MCNC: 0.32 MG/DL (ref 0.2–1)
BUN SERPL-MCNC: 19 MG/DL (ref 5–25)
CALCIUM ALBUM COR SERPL-MCNC: 9.5 MG/DL (ref 8.3–10.1)
CALCIUM SERPL-MCNC: 8.9 MG/DL (ref 8.3–10.1)
CHLORIDE SERPL-SCNC: 103 MMOL/L (ref 96–108)
CHOLEST SERPL-MCNC: 150 MG/DL
CK SERPL-CCNC: 68 U/L (ref 39–308)
CO2 SERPL-SCNC: 28 MMOL/L (ref 21–32)
CREAT SERPL-MCNC: 0.99 MG/DL (ref 0.6–1.3)
EOSINOPHIL # BLD AUTO: 0.17 THOUSAND/ÂΜL (ref 0–0.61)
EOSINOPHIL NFR BLD AUTO: 3 % (ref 0–6)
ERYTHROCYTE [DISTWIDTH] IN BLOOD BY AUTOMATED COUNT: 13.4 % (ref 11.6–15.1)
GFR SERPL CREATININE-BSD FRML MDRD: 69 ML/MIN/1.73SQ M
GLUCOSE P FAST SERPL-MCNC: 95 MG/DL (ref 65–99)
HCT VFR BLD AUTO: 39.1 % (ref 36.5–49.3)
HDLC SERPL-MCNC: 37 MG/DL
HGB BLD-MCNC: 12.8 G/DL (ref 12–17)
IMM GRANULOCYTES # BLD AUTO: 0.03 THOUSAND/UL (ref 0–0.2)
IMM GRANULOCYTES NFR BLD AUTO: 1 % (ref 0–2)
LDLC SERPL CALC-MCNC: 97 MG/DL (ref 0–100)
LYMPHOCYTES # BLD AUTO: 1.85 THOUSANDS/ÂΜL (ref 0.6–4.47)
LYMPHOCYTES NFR BLD AUTO: 28 % (ref 14–44)
MCH RBC QN AUTO: 31 PG (ref 26.8–34.3)
MCHC RBC AUTO-ENTMCNC: 32.7 G/DL (ref 31.4–37.4)
MCV RBC AUTO: 95 FL (ref 82–98)
MONOCYTES # BLD AUTO: 0.75 THOUSAND/ÂΜL (ref 0.17–1.22)
MONOCYTES NFR BLD AUTO: 11 % (ref 4–12)
NEUTROPHILS # BLD AUTO: 3.81 THOUSANDS/ÂΜL (ref 1.85–7.62)
NEUTS SEG NFR BLD AUTO: 56 % (ref 43–75)
NONHDLC SERPL-MCNC: 113 MG/DL
NRBC BLD AUTO-RTO: 0 /100 WBCS
PLATELET # BLD AUTO: 224 THOUSANDS/UL (ref 149–390)
PMV BLD AUTO: 10.9 FL (ref 8.9–12.7)
POTASSIUM SERPL-SCNC: 4.5 MMOL/L (ref 3.5–5.3)
PROT SERPL-MCNC: 7 G/DL (ref 6.4–8.4)
RBC # BLD AUTO: 4.13 MILLION/UL (ref 3.88–5.62)
SODIUM SERPL-SCNC: 136 MMOL/L (ref 135–147)
TRIGL SERPL-MCNC: 82 MG/DL
TSH SERPL DL<=0.05 MIU/L-ACNC: 3.4 UIU/ML (ref 0.45–4.5)
WBC # BLD AUTO: 6.64 THOUSAND/UL (ref 4.31–10.16)

## 2023-02-22 PROBLEM — J69.0 PNEUMONIA, ASPIRATION (HCC): Status: ACTIVE | Noted: 2017-12-17

## 2023-02-22 PROBLEM — J44.9 COPD (CHRONIC OBSTRUCTIVE PULMONARY DISEASE) (HCC): Status: ACTIVE | Noted: 2017-09-28

## 2023-03-02 NOTE — PROGRESS NOTES
Speech-Language Pathology Initial Evaluation    Today's date: 3/6/2023  Patient’s name: Tino Randall  : 1940  MRN: [id number]  Safety measures: Aspiration precautions, PD, TBI, dementia, COPD  Referring provider: Iban Araujo, *    Encounter Diagnosis     ICD-10-CM    1  Oropharyngeal dysphagia  R13 12       2  Alzheimer disease ([address] )  G30 9 Ambulatory referral to Speech Therapy    F02 80       3  Dysphagia, unspecified type  R13 10 Ambulatory referral to Speech Therapy      4  Increased oropharyngeal secretions  K11 7 Ambulatory referral to Speech Therapy        Visit tracking:  -Referring provider: Epic  -Billing guidelines: CMS  -Visit #1/10  -Insurance: Medicare & [address] due 2023    Subjective comments: Patient was accompanied to today's session by his two daughters, who provided patient's history  Per report, patient has been experiencing noisy breathing/stertor ("snoring"); onset about 2 months ago  PCP ordered a chest x-ray, which was reportedly "okay"  Recent ENT assessment revealed "weak throat muscles"  Patient's family does not wish for patient to have a PEG tube placed or tracheostomy as recommended  It was reported that patient is currently consuming pureed foods and honey-thick liquids with small bites/sips and smaller meals  Medications are currently being administered P O  (larger ones cut in half) with HTL  Coughing at times on foods/liquids reported by family, as well as pocketing of medications  No reported weight loss, recent fevers, recent respiratory changes (pulse ox measures reportedly WNL), recent PNA (last 2022)  How did the patient hear about us?  Prior patient    Patient's caregiver's goal(s): "to reduce noises, to get stronger swallowing muscles"    Reason for referral: Difficulty swallowing solids or liquids  Prior functional status: Swallowing effective with use of compensatory strategies  Clinically complex situations: Previous therapy to address similar deficits and chronic conditions    History: Patient is a 79 y o  male who was referred to outpatient skilled Speech Therapy services for a dysphagia evaluation  Patient is known to this OP speech therapy department; he received therapy services for dysphagia between 07/12/2018 and 09/28/2018  Patient was evaluated by ENT on 02/22/2023  Per chart review, patient's family reported that patient has been presenting with noisy breathing (onset 2 months ago)  Patient is currently on a thick liquid diet  Patient was evaluated by his PCP on 01/19/2023  PCP ordered a chest x-ray, which revealed improvement of previous bibasilar pneumonia  Oxygen saturation was reportedly normal at home  Patient with a significant h/o aspiration PNA  Per chart review, a tracheostomy and PEG tube placement were recommended three years ago secondary to patient's diagnosis of severe dysphagia, COPD, PD, TBI, and Alzheimer's dementia--neither were performed  Caregivers reported worsening wheezing, as well as grunting and stertor  Patient with reduced communication abilities and uses a wheelchair  Flexible fiberoptic laryngoscopy completed with ENT revealed: "    No significant mucoid purulence or polyposis in the nasal cavity  Positive reflux of food material in the nasopharynx  No lesions or masses of the nasopharynx, oropharynx, hypopharynx, or larynx  Airway is widely patent  Bilateral vocal folds are fully mobile  No lesions or masses of the subglottis  Significant lateral wall side collapse with agitation  Significant amount of secretions resulting in penetration but no miky aspiration   "    ENT's findings were as follows: "    Discussed the nature of his condition with severe COPD, history of aspiration pneumonia, and severe dysphagia with reflux of food into the nasopharynx  Dicussed with the family that this appears to be end stage dysphagia due to his cognitive decline   Discussed that for adequate treatment and prevention of aspiration going forward patient needs PEG tube and tracheostomy  Family does not feel that this is something he would like  Recommended that they speak with their family doctor about establishing appropriate goals for end of life care  Follow up with our office should goals of tracheostomy or PEG tubes change  Over 1/2 of the 50 minute visit was spent in medical-decision making, counseling, and coordination of care   "    Videofluoroscopic swallow study (VFSS) completed on 09/26/2018 revealed:  "  Philippe Dock Philippe Dock Thin Liquid Trial (VFSS)  Mode of Presentation, Thin Liquid (VFSS): spoon, cup (fed by daughter with SLP guidance)  Volume Presented in mL, Thin Liquid (VFSS): other (see comments) (daughter controlled volumes with SLP guidance)  Oral Phase Results, Thin Liquid (VFSS): impaired oral phase, signs of dysfunction present  Oral Transit Impairment, Thin Liquid (VFSS): premature spillage into pharynx, prolonged oral transit time  Pharyngeal Phase Results, Thin Liquid (VFSS): impaired pharyngeal phase of swallowing  Pharyngeal Phase Impairments, Thin Liquid (VFSS): pharyngeal swallow response delayed, material reaches pyriform sinuses prior to swallow response, material reaches valleculae prior to swallow response  Post Swallow Residue, Thin Liquid (VFSS): post-swallow residue present (mild diffuse)  Upper Esophageal Sphincter Function, Thin Liquid (VFSS): WFL  Rosenbek's Penetration Aspiration Scale, Thin Liquid (VFSS): Level 1  Epiglottic Movement Pattern, Thin Liquid (VFSS): functional but delayed  Comment, Thin Liquid (VFSS): No aspiration or penetration       Nectar Liquid Trial (VFSS)  Mode of Presentation, Nectar Thick (VFSS): other (see comments), spoon (fed by daughter with SLP guidance)  Volume Presented in mL, Nectar Thick (VFSS): (daughter controlled volumes with SLP guidance)  Oral Phase Results, Nectar Thick (VFSS): impaired oral phase, signs of dysfunction present  Oral Transit Impairment, Nectar Thick (VFSS): prolonged oral transit time, premature spillage into pharynx  Pharyngeal Phase Results, Nectar Thick (VFSS): impaired pharyngeal phase of swallowing  Pharyngeal Phase Impairments, Nectar Thick (VFSS): material reaches valleculae prior to swallow response, material reaches pyriform sinuses prior to swallow response, pharyngeal swallow response delayed  Post Swallow Residue, Nectar Thick (VFSS): post-swallow residue present (mild diffuse residue)  Upper Esophageal Sphincter Function, Nectar Thick (VFSS): WFL  Rosenbek's Penetration Aspiration Scale, Nectar Thick (VFSS): Level 2  Epiglottic Movement Pattern, Nectar Thick (VFSS): functional but delayed  Comment, Nectar Thick (VFSS): Transient penetration x1  Puree Trial (VFSS)  Mode of Presentation, Puree (VFSS): other (see comments) (fed by daughter with SLP guidance)  Volume Presented in mL, Puree (VFSS): other (see comments) (daughter controlled volumes with SLP guidance)  Oral Phase Results, Puree (VFSS): impaired oral phase, signs of dysfunction present  Oral Transit Impairment, Puree (VFSS): prolonged oral transit time  Pharyngeal Phase Results, Puree (VFSS): impaired pharyngeal phase of swallowing  Pharyngeal Phase Impairments, Puree (VFSS): pharyngeal swallow response delayed  Upper Esophageal Sphincter Function, Puree (VFSS): WFL  Rosenbek's Penetration Aspiration Scale, Puree (VFSS): Level 1  Epiglottic Movement Pattern, Puree (VFSS): functional but delayed  Comment, Puree (VFSS): No aspiration or penetration       Semi-Solid Trial (VFSS)  Mode of Presentation, Semi-solid (VFSS): other (see comments) (fed by daughter with SLP guidance)  Volume Presented in mL, Semi-solid (VFSS): other (see comments) (daughter controlled volumes with SLP guidance)  Oral Phase Results, Semi-solid (VFSS): impaired oral phase, signs of dysfunction present  Oral Transit Impairment, Semi-solid (VFSS): prolonged oral transit time  Oral Control Impairments, Semi-solid (VFSS): mastication of semisolid and solid foods impaired  Pharyngeal Phase Results, Semi-solid (VFSS): impaired pharyngeal phase of swallowing  Pharyngeal Phase Impairments, Semi-solid (VFSS): pharyngeal swallow response delayed  Upper Esophageal Sphincter Function, Semi-solid (VFSS): WFL  Rosenbek's Penetration Aspiration Scale, Semi-solid (VFSS): Level 1  Epiglottic Movement Pattern, Semi-solid (VFSS): functional but delayed  Comment, Semi-solid (VFSS): No aspiration or penetration  Esophageal Stage:   Not formally assessed    ASSESSMENT  Mild-moderate oropharyngeal phase dysphagia  No aspiration or penetration however swallow is delayed  PLAN  Swallowing Clinical Impression  SLP Received On: 09/26/18  SLP Communication: Vfss completed 9/26/18, recommend puree/thins  SLP Swallowing Diagnosis: mild dysphagia, moderate dysphagia  SLP Diet Recommendation: Dysphagia Level 1 Puree, Thin Liquids     DIET Recommendations:  SLP Diet Recommendation: Dysphagia Level 1 Puree, Thin Liquids     Recommended Form of Meds:  [x]As best tolerated []In Choose One Liquid Form []Whole in Puree []Crushed in Puree []Non-Oral     Precautions:  [x]Head of bed Choose One for meals/medications   [x]Supervision with P O  Close   [x]Assist with P O   Assist as Needed       Strategies:  [x]Alternate food with liquids   [x]Small sips/Bites, One at a time    Education provided to: [x] Patient [] Family/Caregiver   Via: [x] Verbal [] Written [] Visual   [x] Discussed results/goals with [] Patient [x]Family [] Nursing   [] Physician/PA [x] Radiologist "    Hearing: Reduced  Vision: [facility] for testing    Home environment/lifestyle: Lives with family  Highest level of education: Did not assess  Vocational status: Retired    Mental status: Alert  Behavior status: Cooperative  Communication modalities: Limited verbal output (primary/preferred language is Faroese); suspected aphasia per family report  Rehabilitation prognosis: Good rehab potential to reach the established goals    Assessments    DYSPHAGIA EVALUATION:    -Reason for referral: Signs/symptoms of dysphagia and History of aspiration pneumonia    -Subjective report of swallowing difficulty: Coughing    Functional Outcome Measurements  -Functional Oral Intake Scale (FOIS): 5 - total oral intake of multiple consistencies requiring special preparation    -Eating Assessment Tool (EAT-10) Swallowing Screening Tool is a patient self-assessment tool that rates the percieved impairment a swallowing disorder has on the Functional, Physical, and Emotional aspects of one's life    EAT-10 score: 19/40    -Difficulty swallowing: Solids, Liquids and Pills    -Current diet (solids): Pureed  -Current diet (liquids): Family report that patient drinks honey-thick liquids; however, it is suspected that patient may be consuming pudding-thick liquids per observation   -Current pill intake method: Whole with HTL (larger pills cut in half)  -Alternative Feeding Method?: No    -Facial appearance Symmetrical   -Dentition Edentulous   -Labial function Unable to assess secondary to poor direction following   -Lingual function Unable to assess secondary to poor direction following   -Velar function Unable to assess secondary to poor direction following       LIQUID CONSISTENCY TESTING:   Item(s) tested: (Pudding-thick only per family report) - water    Administered by: Latia Núñez and Fed by examiner & daughter    Clinical findings:  -Oral phase impairments: Prolonged oral transit time  -Pharyngeal phase impairments: Delayed swallow initiation      SOLID CONSISTENCY TESTING:  Item(s) tested: (Puree) - applesauce    Administered by: Latia Staggers and Fed by examiner & daughter    Clinical findings:  -Oral phase impairments: Prolonged oral transit time  -Pharyngeal phase impairments: Delayed swallow initiation    Goals    Short-term goals:   Patient will receive a videofluoroscopic swallow study (VFSS) to assess his current swallowing function to r/o penetration or aspiration, to determine the safest, least restrictive diet, and to recommended the appropriate rehabilitation exercises (to be achieved in 2-3 weeks)  Patient's caregivers will be educated on safe swallowing compensatory strategies (e g , upright posture, small bites/sips, slow rate, alternation of consistencies, multiple swallows, etc ) to facilitate increased safety with P O  intake (to be achieved in 4-6 weeks)  Patient will tolerate P O  trials of his recommended diet with the implementation of safe swallowing strategies by his caregivers without overt s/sx of penetration and/or aspiration during skilled therapy sessions in this certification period (to be achieved in 4-6 weeks)  Long-term goals:   Patient will maintain adequate nutrition and hydration with optimum safety and efficacy of swallowing function on P O  intake without overt s/sx of penetration or aspiration (to be achieved by discharge)  Patient's caregivers will independently utilize compensatory strategies when administering foods/liquids/medications to patient to optimize his safety and efficacy of swallowing function without overt s/sx of penetration or aspiration (to be achieved by discharge)  Impressions/Recommendations    Impressions:   -Patient presents with mild-moderate oropharyngeal dysphagia c/b prolonged oral transit time and delayed swallow initiation  No overt s/sx of penetration and/or aspiration was noted on puree or pudding-thick trials today  Patient's family indicated that patient is currently consuming pureed foods and honey-thick liquids at home  It is suspected, however, that patient may be consuming pudding-thick liquids per observation  Patient's family was comfortable with administering puree and pudding-thick liquid consistencies during today's clinician swallow examination   It is recommended that patient receive a videofluoroscopic swallow study (VFSS) to assess his current swallowing function to r/o penetration or aspiration and to determine the safest, least restrictive diet  Patient would benefit from outpatient skilled Speech Therapy services for further education/training on safe swallowing strategies & aspiration precautions, continued assessment of patient's tolerance of the safest, least restrictive diet and complete caregiver education/training      -Clinician presented patient's caregiver with education on dysphagia, aspiration precautions, food/liquid/medication recommendations, safe swallowing strategies, oral care, etc  Supplemental handouts were provided to facilitate increased carryover of recommendations  Future planning (e g , PEG tube) was briefly discussed with pros and cons (e g , will provide alternative means for nutrition/hydration/medications if P O  intake is inadequate to maintain weight, hydration, and medication needs or increased choking risk is present; however, it will not entirely eliminate the risk of aspiration (e g , saliva))  Quality of life is also something to consider  Noted that this is a personal decision/that patient and family have the right to choose or decline  Recommended that patient and family consider having further discussions with his physician      Recommendations:  -Patient would benefit from outpatient skilled Speech Therapy services: Dysphagia therapy  (Due to patient's history of dysphagia, he may benefit from neuromuscular electrical stimulation (NMES) (i e , VitalStim) treatment in conjunction with pharyngeal/laryngeal strengthening exercises and P O  intake, unless otherwise contraindicated )    -Frequency: As needed (TBD following VFSS results/recommendations)  -Duration: 6-8 weeks    -Intervention certification from: 6/6/4997  -Intervention certification to: 86/82/7244    -Intervention comments:   -Initial dysphagia evaluation with patient (20 minutes)  -Dysphagia treatment, including discussion about recommendations, POC/goals, and HEP review (40 minutes)      SWALLOWING SAFETY PRECAUTIONS:  -Recommended solids: Puree    -Recommended liquids: Honey-thick    -Recommended medication form: As tolerated (consider crushing in puree if able--check with MD or pharmacist to ensure medications can be crushed)    -Frequent/thorough oral care    -Aspiration precautions   *Monitor for signs/symptoms concerning for aspiration (e g , low grade fever, increase in WBC, change in chest x-ray, increased congestion, increased coughing with P O  intake)    -Strategies: Small sips and bites when eating, Slow rate, swallow between bites and Alternate liquids and solids    -Positioning: Upright position during meals and Upright position at least 30 minutes after P O  intake    -Supervision: Feed by Caregiver    -Referrals: Miquel De La Cruz

## 2023-03-06 ENCOUNTER — EVALUATION (OUTPATIENT)
Dept: SPEECH THERAPY | Facility: CLINIC | Age: 83
End: 2023-03-06

## 2023-03-06 DIAGNOSIS — R13.12 OROPHARYNGEAL DYSPHAGIA: Primary | ICD-10-CM

## 2023-03-06 DIAGNOSIS — F02.80 ALZHEIMER DISEASE (HCC): ICD-10-CM

## 2023-03-06 DIAGNOSIS — G30.9 ALZHEIMER DISEASE (HCC): ICD-10-CM

## 2023-03-06 DIAGNOSIS — K11.7 INCREASED OROPHARYNGEAL SECRETIONS: ICD-10-CM

## 2023-03-06 DIAGNOSIS — R13.10 DYSPHAGIA, UNSPECIFIED TYPE: ICD-10-CM
